# Patient Record
Sex: FEMALE | Race: BLACK OR AFRICAN AMERICAN | NOT HISPANIC OR LATINO | Employment: FULL TIME | URBAN - METROPOLITAN AREA
[De-identification: names, ages, dates, MRNs, and addresses within clinical notes are randomized per-mention and may not be internally consistent; named-entity substitution may affect disease eponyms.]

---

## 2018-11-07 ENCOUNTER — TELEPHONE (OUTPATIENT)
Dept: FAMILY MEDICINE | Facility: CLINIC | Age: 38
End: 2018-11-07

## 2018-11-07 NOTE — TELEPHONE ENCOUNTER
----- Message from Rob Galvez sent at 11/7/2018  9:37 AM CST -----  Patient is requesting orders be put in chart:    Orders Requested: Blood work  Reason for the orders: Physical on 11.13  Call Back: [phone] (home)     Please call once orders have been entered.

## 2018-11-13 ENCOUNTER — OFFICE VISIT (OUTPATIENT)
Dept: FAMILY MEDICINE | Facility: CLINIC | Age: 38
End: 2018-11-13
Payer: COMMERCIAL

## 2018-11-13 ENCOUNTER — LAB VISIT (OUTPATIENT)
Dept: LAB | Facility: HOSPITAL | Age: 38
End: 2018-11-13
Attending: FAMILY MEDICINE
Payer: COMMERCIAL

## 2018-11-13 VITALS
HEIGHT: 65 IN | HEART RATE: 72 BPM | WEIGHT: 241.38 LBS | SYSTOLIC BLOOD PRESSURE: 122 MMHG | DIASTOLIC BLOOD PRESSURE: 84 MMHG | BODY MASS INDEX: 40.22 KG/M2

## 2018-11-13 DIAGNOSIS — J30.1 NON-SEASONAL ALLERGIC RHINITIS DUE TO POLLEN: ICD-10-CM

## 2018-11-13 DIAGNOSIS — R63.5 ABNORMAL WEIGHT GAIN: ICD-10-CM

## 2018-11-13 DIAGNOSIS — Z00.01 ENCOUNTER FOR ROUTINE ADULT HEALTH EXAMINATION WITH ABNORMAL FINDINGS: ICD-10-CM

## 2018-11-13 DIAGNOSIS — E66.01 CLASS 3 SEVERE OBESITY DUE TO EXCESS CALORIES WITHOUT SERIOUS COMORBIDITY WITH BODY MASS INDEX (BMI) OF 40.0 TO 44.9 IN ADULT: ICD-10-CM

## 2018-11-13 DIAGNOSIS — Z00.01 ENCOUNTER FOR ROUTINE ADULT HEALTH EXAMINATION WITH ABNORMAL FINDINGS: Primary | ICD-10-CM

## 2018-11-13 PROBLEM — E66.813 CLASS 3 SEVERE OBESITY DUE TO EXCESS CALORIES WITHOUT SERIOUS COMORBIDITY WITH BODY MASS INDEX (BMI) OF 40.0 TO 44.9 IN ADULT: Status: ACTIVE | Noted: 2018-11-13

## 2018-11-13 LAB
ALBUMIN SERPL BCP-MCNC: 3.5 G/DL
ALP SERPL-CCNC: 66 U/L
ALT SERPL W/O P-5'-P-CCNC: 17 U/L
ANION GAP SERPL CALC-SCNC: 7 MMOL/L
AST SERPL-CCNC: 21 U/L
BASOPHILS # BLD AUTO: 0.04 K/UL
BASOPHILS NFR BLD: 0.5 %
BILIRUB SERPL-MCNC: 0.3 MG/DL
BUN SERPL-MCNC: 8 MG/DL
CALCIUM SERPL-MCNC: 9.5 MG/DL
CHLORIDE SERPL-SCNC: 104 MMOL/L
CHOLEST SERPL-MCNC: 219 MG/DL
CHOLEST/HDLC SERPL: 4.1 {RATIO}
CO2 SERPL-SCNC: 27 MMOL/L
CREAT SERPL-MCNC: 1 MG/DL
DIFFERENTIAL METHOD: ABNORMAL
EOSINOPHIL # BLD AUTO: 0.1 K/UL
EOSINOPHIL NFR BLD: 1.5 %
ERYTHROCYTE [DISTWIDTH] IN BLOOD BY AUTOMATED COUNT: 13 %
EST. GFR  (AFRICAN AMERICAN): >60 ML/MIN/1.73 M^2
EST. GFR  (NON AFRICAN AMERICAN): >60 ML/MIN/1.73 M^2
GLUCOSE SERPL-MCNC: 108 MG/DL
HCT VFR BLD AUTO: 43 %
HDLC SERPL-MCNC: 54 MG/DL
HDLC SERPL: 24.7 %
HGB BLD-MCNC: 13.6 G/DL
IMM GRANULOCYTES # BLD AUTO: 0.02 K/UL
IMM GRANULOCYTES NFR BLD AUTO: 0.3 %
LDLC SERPL CALC-MCNC: 127.8 MG/DL
LYMPHOCYTES # BLD AUTO: 1.7 K/UL
LYMPHOCYTES NFR BLD: 21.1 %
MCH RBC QN AUTO: 31.1 PG
MCHC RBC AUTO-ENTMCNC: 31.6 G/DL
MCV RBC AUTO: 98 FL
MONOCYTES # BLD AUTO: 0.6 K/UL
MONOCYTES NFR BLD: 7.4 %
NEUTROPHILS # BLD AUTO: 5.5 K/UL
NEUTROPHILS NFR BLD: 69.2 %
NONHDLC SERPL-MCNC: 165 MG/DL
NRBC BLD-RTO: 0 /100 WBC
PLATELET # BLD AUTO: 246 K/UL
PMV BLD AUTO: 11.1 FL
POTASSIUM SERPL-SCNC: 4.5 MMOL/L
PROT SERPL-MCNC: 7.4 G/DL
RBC # BLD AUTO: 4.37 M/UL
SODIUM SERPL-SCNC: 138 MMOL/L
TRIGL SERPL-MCNC: 186 MG/DL
TSH SERPL DL<=0.005 MIU/L-ACNC: 1.48 UIU/ML
WBC # BLD AUTO: 7.93 K/UL

## 2018-11-13 PROCEDURE — 80053 COMPREHEN METABOLIC PANEL: CPT

## 2018-11-13 PROCEDURE — 85025 COMPLETE CBC W/AUTO DIFF WBC: CPT

## 2018-11-13 PROCEDURE — 90714 TD VACC NO PRESV 7 YRS+ IM: CPT | Mod: S$GLB,,, | Performed by: FAMILY MEDICINE

## 2018-11-13 PROCEDURE — 99999 PR PBB SHADOW E&M-EST. PATIENT-LVL III: CPT | Mod: PBBFAC,,, | Performed by: FAMILY MEDICINE

## 2018-11-13 PROCEDURE — 80061 LIPID PANEL: CPT

## 2018-11-13 PROCEDURE — 99385 PREV VISIT NEW AGE 18-39: CPT | Mod: S$GLB,,, | Performed by: FAMILY MEDICINE

## 2018-11-13 PROCEDURE — 84443 ASSAY THYROID STIM HORMONE: CPT

## 2018-11-13 PROCEDURE — 90471 IMMUNIZATION ADMIN: CPT | Mod: S$GLB,,, | Performed by: FAMILY MEDICINE

## 2018-11-13 PROCEDURE — 36415 COLL VENOUS BLD VENIPUNCTURE: CPT | Mod: PO

## 2018-11-13 RX ORDER — AZELASTINE HYDROCHLORIDE 0.5 MG/ML
1 SOLUTION/ DROPS OPHTHALMIC 2 TIMES DAILY
COMMUNITY

## 2018-11-13 RX ORDER — NORGESTIMATE AND ETHINYL ESTRADIOL 7DAYSX3 LO
1 KIT ORAL DAILY
COMMUNITY
End: 2019-07-01 | Stop reason: SDUPTHER

## 2018-11-13 RX ORDER — MOMETASONE FUROATE 50 UG/1
SPRAY, METERED NASAL DAILY
COMMUNITY
End: 2022-11-22

## 2018-11-13 RX ORDER — MONTELUKAST SODIUM 10 MG/1
10 TABLET ORAL NIGHTLY
COMMUNITY
End: 2021-03-05 | Stop reason: SDUPTHER

## 2018-11-13 RX ORDER — AZELASTINE 1 MG/ML
1 SPRAY, METERED NASAL 2 TIMES DAILY
COMMUNITY

## 2018-11-13 NOTE — PATIENT INSTRUCTIONS

## 2018-11-13 NOTE — PROGRESS NOTES
Subjective:       Patient ID: Fabienne Cummings is a 38 y.o. female.    Chief Complaint: Annual Exam and Nasal Congestion    The patient is coming here today for annual checkup.  The patient came from [place], she has history of chronic allergies, which she takes montelukast, and uses Astelin and Nasonex with moderate relief of the symptoms, she currently sees an allergist.  She states that in the last 6 months she has been gaining more than 20 lb.  When she came from [place], her eating habits change and started to gain weight.  She just started to exercise again at least 1 hr twice a week and also eating healthier.  She denies any symptoms of chest pain, shortness of breath, nausea, vomiting, abdominal pain, diarrhea and constipation.     Past medical history, past social history, past Family history, past surgical history was reviewed and discussed with the patient    Review of Systems   Constitutional: Positive for unexpected weight change. Negative for activity change, appetite change and chills.   HENT: Negative for congestion, ear discharge and postnasal drip.    Eyes: Negative for discharge and itching.   Respiratory: Negative for choking and chest tightness.    Cardiovascular: Negative for chest pain, palpitations and leg swelling.   Gastrointestinal: Negative for abdominal distention, abdominal pain and constipation.   Endocrine: Negative for cold intolerance and heat intolerance.   Genitourinary: Negative for dysuria and flank pain.   Musculoskeletal: Negative for arthralgias and back pain.   Skin: Negative for pallor and rash.   Allergic/Immunologic: Positive for environmental allergies. Negative for food allergies.   Neurological: Negative for dizziness, facial asymmetry and headaches.   Hematological: Negative for adenopathy. Does not bruise/bleed easily.   Psychiatric/Behavioral: Negative for agitation, confusion, decreased concentration and sleep disturbance.       Objective:      Physical Exam    Constitutional: She appears well-developed and well-nourished. No distress.   HENT:   Head: Normocephalic and atraumatic.   Right Ear: External ear normal.   Left Ear: External ear normal.   Nose: Nose normal.   Mouth/Throat: Oropharynx is clear and moist. No oropharyngeal exudate.   Eyes: Conjunctivae are normal. Pupils are equal, round, and reactive to light. Right eye exhibits no discharge. Left eye exhibits no discharge.   Neck: Neck supple. No tracheal deviation present. No thyromegaly present.   Cardiovascular: Normal rate, regular rhythm, normal heart sounds and intact distal pulses.   No murmur heard.  Pulmonary/Chest: Effort normal and breath sounds normal. No respiratory distress. She has no wheezes.   Abdominal: She exhibits no distension. There is no tenderness.   Musculoskeletal: She exhibits no tenderness or deformity.   Lymphadenopathy:     She has no cervical adenopathy.   Neurological: No cranial nerve deficit. Coordination normal.   Skin: No rash noted. She is not diaphoretic. No erythema. No pallor.   Psychiatric: She has a normal mood and affect. Her behavior is normal. Judgment and thought content normal.   Nursing note and vitals reviewed.      Assessment:       1. Encounter for routine adult health examination with abnormal findings    2. Abnormal weight gain    3. BMI 40.0-44.9, adult    4. Class 3 severe obesity due to excess calories without serious comorbidity with body mass index (BMI) of 40.0 to 44.9 in adult    5. Non-seasonal allergic rhinitis due to pollen        Plan:       Encounter for routine adult health examination with abnormal findings  -     CBC auto differential; Future; Expected date: 11/13/2018  -     Lipid panel; Future; Expected date: 11/13/2018  -     Comprehensive metabolic panel; Future; Expected date: 11/13/2018  -     TSH; Future; Expected date: 11/13/2018    Abnormal weight gain:  Worsening  -     CBC auto differential; Future; Expected date: 11/13/2018  -      Lipid panel; Future; Expected date: 11/13/2018  -     Comprehensive metabolic panel; Future; Expected date: 11/13/2018  -     TSH; Future; Expected date: 11/13/2018    BMI 40.0-44.9, adult:  Worsening    Class 3 severe obesity due to excess calories without serious comorbidity with body mass index (BMI) of 40.0 to 44.9 in adult:  Worsening    Non-seasonal allergic rhinitis due to pollen:  Stable.  Continue with current treatment    Other orders  -     (In Office Administered) Td Vaccine - Preservative Free    Will call the patient after we have the results of the test, the patient states that she had a Pap smear done by her primary care physician in [place] 3 years ago and was told that she did not need another 1 for 5 years.  Medical records were ordered.  Healthy eating habits, weight fried foods, red meat and processed starches, follow up with the allergist as directed. The patient's BMI has been recorded in the chart. The patient has been provided educational materials regarding the benefits of attaining and maintaining a normal weight. We will continue to address and follow this issue during follow up visits.Patient agreed with assessment and plan. Patient verbalized understanding.

## 2018-11-13 NOTE — Clinical Note
Patient had a pap smear done by Dr. MALGORZATA Diaz [id number], 2015. [facility].

## 2018-11-14 ENCOUNTER — TELEPHONE (OUTPATIENT)
Dept: FAMILY MEDICINE | Facility: CLINIC | Age: 38
End: 2018-11-14

## 2018-11-14 NOTE — TELEPHONE ENCOUNTER
----- Message from Duane Herrera sent at 11/14/2018 12:48 PM CST -----  Contact: Patient  Type: Needs Medical Advice    Who Called:  Patient  Best Call Back Number: [phone]  Additional Information: Patient previously had information faxed over and would like to know if it was received. Please call to advise. Thanks!

## 2018-11-15 ENCOUNTER — TELEPHONE (OUTPATIENT)
Dept: ADMINISTRATIVE | Facility: HOSPITAL | Age: 38
End: 2018-11-15

## 2018-11-15 NOTE — TELEPHONE ENCOUNTER
----- Message from Ladi Noyola MD sent at 11/13/2018  9:56 AM CST -----  Patient had a pap smear done by Dr. MALGORZATA Diaz [id number], 2015. [facility].

## 2019-04-12 DIAGNOSIS — Z12.39 BREAST CANCER SCREENING: ICD-10-CM

## 2019-05-01 ENCOUNTER — PATIENT OUTREACH (OUTPATIENT)
Dept: ADMINISTRATIVE | Facility: HOSPITAL | Age: 39
End: 2019-05-01

## 2019-05-13 ENCOUNTER — HOSPITAL ENCOUNTER (OUTPATIENT)
Dept: RADIOLOGY | Facility: HOSPITAL | Age: 39
Discharge: HOME | End: 2019-05-13
Attending: FAMILY MEDICINE
Payer: COMMERCIAL

## 2019-05-13 VITALS — BODY MASS INDEX: 40.15 KG/M2 | WEIGHT: 241 LBS | HEIGHT: 65 IN

## 2019-05-13 DIAGNOSIS — Z12.39 BREAST CANCER SCREENING: ICD-10-CM

## 2019-05-13 PROCEDURE — 77067 MAMMO DIGITAL SCREENING BILAT WITH TOMOSYNTHESIS_CAD: ICD-10-PCS | Mod: 26,,, | Performed by: RADIOLOGY

## 2019-05-13 PROCEDURE — 77063 BREAST TOMOSYNTHESIS BI: CPT | Mod: 26,,, | Performed by: RADIOLOGY

## 2019-05-13 PROCEDURE — 77067 SCR MAMMO BI INCL CAD: CPT | Mod: TC,PN

## 2019-05-13 PROCEDURE — 77067 SCR MAMMO BI INCL CAD: CPT | Mod: 26,,, | Performed by: RADIOLOGY

## 2019-05-13 PROCEDURE — 77063 MAMMO DIGITAL SCREENING BILAT WITH TOMOSYNTHESIS_CAD: ICD-10-PCS | Mod: 26,,, | Performed by: RADIOLOGY

## 2019-05-15 ENCOUNTER — PATIENT MESSAGE (OUTPATIENT)
Dept: FAMILY MEDICINE | Facility: CLINIC | Age: 39
End: 2019-05-15

## 2019-05-15 ENCOUNTER — OFFICE VISIT (OUTPATIENT)
Dept: FAMILY MEDICINE | Facility: CLINIC | Age: 39
End: 2019-05-15
Payer: COMMERCIAL

## 2019-05-15 VITALS
DIASTOLIC BLOOD PRESSURE: 80 MMHG | SYSTOLIC BLOOD PRESSURE: 130 MMHG | WEIGHT: 237.44 LBS | BODY MASS INDEX: 39.51 KG/M2 | OXYGEN SATURATION: 99 % | HEART RATE: 71 BPM

## 2019-05-15 DIAGNOSIS — J30.1 NON-SEASONAL ALLERGIC RHINITIS DUE TO POLLEN: Primary | ICD-10-CM

## 2019-05-15 DIAGNOSIS — E66.09 CLASS 2 OBESITY DUE TO EXCESS CALORIES WITHOUT SERIOUS COMORBIDITY WITH BODY MASS INDEX (BMI) OF 39.0 TO 39.9 IN ADULT: ICD-10-CM

## 2019-05-15 DIAGNOSIS — E78.1 PURE HYPERGLYCERIDEMIA: ICD-10-CM

## 2019-05-15 PROCEDURE — 3008F PR BODY MASS INDEX (BMI) DOCUMENTED: ICD-10-PCS | Mod: CPTII,S$GLB,, | Performed by: FAMILY MEDICINE

## 2019-05-15 PROCEDURE — 99999 PR PBB SHADOW E&M-EST. PATIENT-LVL III: ICD-10-PCS | Mod: PBBFAC,,, | Performed by: FAMILY MEDICINE

## 2019-05-15 PROCEDURE — 99999 PR PBB SHADOW E&M-EST. PATIENT-LVL III: CPT | Mod: PBBFAC,,, | Performed by: FAMILY MEDICINE

## 2019-05-15 PROCEDURE — 99213 PR OFFICE/OUTPT VISIT, EST, LEVL III, 20-29 MIN: ICD-10-PCS | Mod: S$GLB,,, | Performed by: FAMILY MEDICINE

## 2019-05-15 PROCEDURE — 3008F BODY MASS INDEX DOCD: CPT | Mod: CPTII,S$GLB,, | Performed by: FAMILY MEDICINE

## 2019-05-15 PROCEDURE — 99213 OFFICE O/P EST LOW 20 MIN: CPT | Mod: S$GLB,,, | Performed by: FAMILY MEDICINE

## 2019-05-15 NOTE — PATIENT INSTRUCTIONS
Eating Heart-Healthy Foods  Eating has a big impact on your heart health. In fact, eating healthier can improve several of your heart risks at once. For instance, it helps you manage weight, cholesterol, and blood pressure. Here are ideas to help you make heart-healthy changes without giving up all the foods and flavors you love.  Getting started  · Talk with your health care provider about eating plans, such as the DASH or Mediterranean diet. You may also be referred to a dietitian.  · Change a few things at a time. Give yourself time to get used to a few eating changes before adding more.  · Work to create a tasty, healthy eating plan that you can stick to for the rest of your life.    Goals for healthy eating  Below are some tips to improve your eating habits:  · Limit saturated fats and trans fats. Saturated fats raise your levels of cholesterol, so keep these fats to a minimum. They are found in foods such as fatty meats, whole milk, cheese, and palm and coconut oils. Avoid trans fats because they lower good cholesterol as well as raise bad cholesterol. Trans fats are most often found in processed foods.  · Reduce sodium (salt) intake. Eating too much salt may increase your blood pressure. Limit your sodium intake to 2,300 milligrams (mg) per day, or less if your health care provider recommends it. Dining out less often and eating fewer processed foods are two great ways to decrease the amount of salt you consume.  · Managing calories. A calorie is a unit of energy. Your body burns calories for fuel, but if you eat more calories than your body burns, the extras are stored as fat. Your health care provider can help you create a diet plan to manage your calories. This will likely include eating healthier foods as well as exercising regularly. To help you track your progress, keep a diary to record what you eat and how often you exercise.  Choose the right foods  Aim to make these foods staples of your diet. If  you have diabetes, you may have different recommendations than what is listed here:  · Fruits and vegetable provide plenty of nutrients without a lot of calories. At meals, fill half your plate with these foods. Split the other half of your plate between whole grains and lean protein.  · Whole grains are high in fiber and rich in vitamins and nutrients. Good choices include whole-wheat bread, pasta, and brown rice.  · Lean proteins give you nutrition with less fat. Good choices include fish, skinless chicken, and beans.  · Low-fat or nonfat dairy provides nutrients without a lot of fat. Try low-fat or nonfat milk, cheese, or yogurt.  · Healthy fats can be good for you in small amounts. These are unsaturated fats, such as olive oil, nuts, and fish. Try to have at least 2 servings per week of fatty fish such as salmon, sardines, mackerel, rainbow trout, and albacore tuna. These contain omega-3 fatty acids, which are good for your heart. Flaxseed is another source of a heart-healthy fat.  More on heart healthy eating    Read food labels  Healthy eating starts at the grocery store. Be sure to pay attention to food labels on packaged foods. Look for products that are high in fiber and protein, and low in saturated fat, cholesterol, and sodium. Avoid products that contain trans fat. And pay close attention to serving size. For instance, if you plan to eat two servings, double all the numbers on the label.  Prepare food right  A key part of healthy cooking is cutting down on added fat and salt. Look on the internet for lower-fat, lower-sodium recipes. Also, try these tips:  · Remove fat from meat and skin from poultry before cooking.  · Skim fat from the surface of soups and sauces.  · Broil, boil, bake, steam, grill, and microwave food without added fats.  · Choose ingredients that spice up your food without adding calories, fat, or sodium. Try these items: horseradish, hot sauce, lemon, mustard, nonfat salad dressings,  "and vinegar. For salt-free herbs and spices, try basil, cilantro, cinnamon, pepper, and rosemary.  Date Last Reviewed: 6/25/2015  © 8965-2232 [employer]. [address] [place] [address]. All rights reserved. This information is not intended as a substitute for professional medical care. Always follow your healthcare professional's instructions.        Understanding Body Mass Index (BMI)  Body mass index (BMI) is a method of screening for a weight category using the ratio of your height to your weight. The BMI is a measure of overweight that is corrected for height. Knowing your BMI is a way to tell if you are at a healthy weight. The higher your BMI, the greater your risk for weight-related health problems.  What BMI means  · BMI below 18.5: Underweight  · BMI 18.5 to 24.9: Healthy weight or "ideal body weight"   · BMI 25 to 29.9: Overweight  · BMI 30 and over: Obese  · BMI 40 and over: Severe obesity   Online BMI Calculators  Find your BMI with an online BMI calculator tool, such as these from the CDC:  · BMI calculator for adults  · BMI calculator for children and teens   Using the BMI chart  To figure out your BMI, find your height and weight (or the numbers closest to them) on the table below. Follow each column of numbers to where your height and weight meet on the table. That is your BMI.    Date Last Reviewed: 7/1/2016  © 8380-0941 [employer]. [address] [place] [address]. All rights reserved. This information is not intended as a substitute for professional medical care. Always follow your healthcare professional's instructions.        "

## 2019-05-15 NOTE — PROGRESS NOTES
Subjective:       Patient ID: Fabienne Cummings is a 39 y.o. female.    Chief Complaint: Follow-up results, weight loss    The patient is coming here today for a follow-up visit, lost 4 lb since her last office visit, she just been able to exercise more since her Achillis tendinitis is improved after physical therapy.  The patient also is still having allergies which he takes montelukast, azelastine ophthalmic solution and Nasonex, she currently seen the ENT specialist for this problem.    Hyperlipidemia   This is a new problem. This is a new diagnosis. The problem is uncontrolled. Recent lipid tests were reviewed and are high. Exacerbating diseases include obesity. She has no history of chronic renal disease, diabetes, hypothyroidism, liver disease or nephrotic syndrome. Factors aggravating her hyperlipidemia include fatty foods. Pertinent negatives include no chest pain, focal sensory loss, focal weakness, leg pain, myalgias or shortness of breath. Current antihyperlipidemic treatment includes diet change. Improvement on treatment: Will order blood work at her next office visit. Compliance problems include adherence to diet and adherence to exercise.  Risk factors for coronary artery disease include dyslipidemia, obesity and a sedentary lifestyle.      Past medical history, past social history was reviewed discussed with the patient.    Review of Systems   Constitutional: Positive for activity change. Negative for unexpected weight change.   Eyes: Negative for discharge and visual disturbance.   Respiratory: Negative for chest tightness, shortness of breath and wheezing.    Cardiovascular: Negative for chest pain and palpitations.   Endocrine: Negative for polyuria.   Genitourinary: Positive for menstrual problem. Negative for difficulty urinating, dysuria and hematuria.   Musculoskeletal: Negative for myalgias.   Neurological: Negative for focal weakness.       Objective:      Physical Exam   Constitutional: She  appears well-developed and well-nourished. No distress.   HENT:   Head: Normocephalic and atraumatic.   Right Ear: External ear normal.   Left Ear: External ear normal.   Eyes: Right eye exhibits no discharge. Left eye exhibits no discharge.   Cardiovascular: Normal rate, regular rhythm and normal heart sounds.   No murmur heard.  Pulmonary/Chest: Effort normal and breath sounds normal. No respiratory distress. She has no wheezes.   Musculoskeletal: She exhibits no deformity.   Neurological: No cranial nerve deficit.   Skin: She is not diaphoretic. No erythema.   Psychiatric: She has a normal mood and affect. Her behavior is normal. Judgment and thought content normal.   Nursing note and vitals reviewed.      Assessment:       1. Non-seasonal allergic rhinitis due to pollen    2. Pure hyperglyceridemia    3. BMI 39.0-39.9,adult        Plan:       Non-seasonal allergic rhinitis due to pollen: Stable    Pure hyperglyceridemia: Uncontrolled.    Class 2 obesity due to excess calories without serious comorbidity with body mass index (BMI) of 39.0 to 39.9 in adult:  Improved    The patient was advised to continue to eat healthy, avoid fried foods, red meat and processed starches, try to eat 3 meals a day, 3 snacks and small portions, decrease carbohydrate intake, drink plenty water, increase physical activity as tolerated.  Continue taking the allergy medication, if the symptoms get worse, the patient will notify us immediately.  Will to Pap smear at her next office visit as patient preference.The patient's BMI has been recorded in the chart. The patient has been provided educational materials regarding the benefits of attaining and maintaining a normal weight. We will continue to address and follow this issue during follow up visits.Patient agreed with assessment and plan. Patient verbalized understanding.

## 2019-05-16 ENCOUNTER — PATIENT MESSAGE (OUTPATIENT)
Dept: FAMILY MEDICINE | Facility: CLINIC | Age: 39
End: 2019-05-16

## 2019-05-17 DIAGNOSIS — Z12.39 BREAST CANCER SCREENING: ICD-10-CM

## 2019-05-24 ENCOUNTER — PATIENT MESSAGE (OUTPATIENT)
Dept: ADMINISTRATIVE | Facility: HOSPITAL | Age: 39
End: 2019-05-24

## 2019-07-01 ENCOUNTER — PATIENT MESSAGE (OUTPATIENT)
Dept: FAMILY MEDICINE | Facility: CLINIC | Age: 39
End: 2019-07-01

## 2019-07-01 RX ORDER — NORGESTIMATE AND ETHINYL ESTRADIOL 7DAYSX3 LO
1 KIT ORAL DAILY
Qty: 90 TABLET | Refills: 0 | Status: SHIPPED | OUTPATIENT
Start: 2019-07-01 | End: 2019-07-08 | Stop reason: SDUPTHER

## 2019-07-08 RX ORDER — NORGESTIMATE AND ETHINYL ESTRADIOL 7DAYSX3 LO
1 KIT ORAL DAILY
Qty: 90 TABLET | Refills: 0 | Status: SHIPPED | OUTPATIENT
Start: 2019-07-08 | End: 2019-09-12 | Stop reason: SDUPTHER

## 2019-07-08 NOTE — TELEPHONE ENCOUNTER
----- Message from Duane Herrera sent at 7/8/2019  2:02 PM CDT -----  Contact: Patient  Type:  RX Refill Request    Who Called:  Patient  Refill or New Rx:  Refill  RX Name and Strength:  norgestimate-ethinyl estradiol (ORTHO TRI-CYCLEN LO) 0.18/0.215/0.25 mg-25 mcg tablet  How is the patient currently taking it? (ex. 1XDay):  As ordered  Is this a 30 day or 90 day RX:  90  Preferred Pharmacy with phone number:    [id number] - [place], [place] - [address] [facility] [address]  [address] [address]  Phone: [phone] Fax: [phone]  Local or Mail Order:  Local  Ordering Provider:  Dr. Jazmín Prieto Call Back Number:  [phone]  Additional Information:  Patient states that [employer] (and [facility] by extension) is not able to refill the above medication. Patient would prefer for the first refill to be sent to [facility] (and is fine with currently only receiving 30 day supply) and for future orders to be sent to the below. Thanks!    [facility] - [place], [place] - [address]  [address]  [address]  [facility] [address]  Phone: [phone] Fax: [phone]

## 2019-07-08 NOTE — TELEPHONE ENCOUNTER
Patient states her employer changed prescription suppliers; today she can  rx from [facility], going forward she will use [facility].

## 2019-09-12 RX ORDER — NORGESTIMATE AND ETHINYL ESTRADIOL 7DAYSX3 LO
1 KIT ORAL DAILY
Qty: 90 TABLET | Refills: 0 | Status: SHIPPED | OUTPATIENT
Start: 2019-09-12 | End: 2019-09-17 | Stop reason: SDUPTHER

## 2019-09-17 RX ORDER — NORGESTIMATE AND ETHINYL ESTRADIOL 7DAYSX3 LO
1 KIT ORAL DAILY
Qty: 90 TABLET | Refills: 0 | Status: SHIPPED | OUTPATIENT
Start: 2019-09-17 | End: 2019-11-15 | Stop reason: SDUPTHER

## 2019-10-08 ENCOUNTER — PATIENT MESSAGE (OUTPATIENT)
Dept: FAMILY MEDICINE | Facility: CLINIC | Age: 39
End: 2019-10-08

## 2019-11-12 ENCOUNTER — PATIENT OUTREACH (OUTPATIENT)
Dept: ADMINISTRATIVE | Facility: HOSPITAL | Age: 39
End: 2019-11-12

## 2019-11-15 ENCOUNTER — LAB VISIT (OUTPATIENT)
Dept: LAB | Facility: HOSPITAL | Age: 39
End: 2019-11-15
Attending: FAMILY MEDICINE
Payer: COMMERCIAL

## 2019-11-15 ENCOUNTER — OFFICE VISIT (OUTPATIENT)
Dept: FAMILY MEDICINE | Facility: CLINIC | Age: 39
End: 2019-11-15
Payer: COMMERCIAL

## 2019-11-15 VITALS
HEART RATE: 68 BPM | SYSTOLIC BLOOD PRESSURE: 134 MMHG | OXYGEN SATURATION: 98 % | BODY MASS INDEX: 39.6 KG/M2 | DIASTOLIC BLOOD PRESSURE: 78 MMHG | WEIGHT: 238 LBS

## 2019-11-15 DIAGNOSIS — R73.09 ABNORMAL GLUCOSE: ICD-10-CM

## 2019-11-15 DIAGNOSIS — Z00.01 ENCOUNTER FOR ROUTINE ADULT HEALTH EXAMINATION WITH ABNORMAL FINDINGS: Primary | ICD-10-CM

## 2019-11-15 DIAGNOSIS — E66.09 CLASS 2 OBESITY DUE TO EXCESS CALORIES WITHOUT SERIOUS COMORBIDITY WITH BODY MASS INDEX (BMI) OF 39.0 TO 39.9 IN ADULT: ICD-10-CM

## 2019-11-15 DIAGNOSIS — Z30.41 ENCOUNTER FOR SURVEILLANCE OF CONTRACEPTIVE PILLS: ICD-10-CM

## 2019-11-15 DIAGNOSIS — Z00.01 ENCOUNTER FOR ROUTINE ADULT HEALTH EXAMINATION WITH ABNORMAL FINDINGS: ICD-10-CM

## 2019-11-15 DIAGNOSIS — Z12.39 SCREENING FOR BREAST CANCER: ICD-10-CM

## 2019-11-15 PROBLEM — E66.812 CLASS 2 OBESITY DUE TO EXCESS CALORIES WITHOUT SERIOUS COMORBIDITY WITH BODY MASS INDEX (BMI) OF 39.0 TO 39.9 IN ADULT: Status: ACTIVE | Noted: 2018-11-13

## 2019-11-15 PROCEDURE — 88141 CYTOPATH C/V INTERPRET: CPT | Mod: ,,, | Performed by: PATHOLOGY

## 2019-11-15 PROCEDURE — 90471 IMMUNIZATION ADMIN: CPT | Mod: S$GLB,,, | Performed by: FAMILY MEDICINE

## 2019-11-15 PROCEDURE — 88142 CYTOPATH C/V THIN LAYER: CPT | Performed by: PATHOLOGY

## 2019-11-15 PROCEDURE — 80061 LIPID PANEL: CPT

## 2019-11-15 PROCEDURE — 36415 COLL VENOUS BLD VENIPUNCTURE: CPT | Mod: PO

## 2019-11-15 PROCEDURE — 99396 PREV VISIT EST AGE 40-64: CPT | Mod: 25,S$GLB,, | Performed by: FAMILY MEDICINE

## 2019-11-15 PROCEDURE — 90471 FLU VACCINE (QUAD) GREATER THAN OR EQUAL TO 3YO PRESERVATIVE FREE IM: ICD-10-PCS | Mod: S$GLB,,, | Performed by: FAMILY MEDICINE

## 2019-11-15 PROCEDURE — 83036 HEMOGLOBIN GLYCOSYLATED A1C: CPT

## 2019-11-15 PROCEDURE — 99999 PR PBB SHADOW E&M-EST. PATIENT-LVL III: ICD-10-PCS | Mod: PBBFAC,,, | Performed by: FAMILY MEDICINE

## 2019-11-15 PROCEDURE — 99999 PR PBB SHADOW E&M-EST. PATIENT-LVL III: CPT | Mod: PBBFAC,,, | Performed by: FAMILY MEDICINE

## 2019-11-15 PROCEDURE — 90686 FLU VACCINE (QUAD) GREATER THAN OR EQUAL TO 3YO PRESERVATIVE FREE IM: ICD-10-PCS | Mod: S$GLB,,, | Performed by: FAMILY MEDICINE

## 2019-11-15 PROCEDURE — 80053 COMPREHEN METABOLIC PANEL: CPT

## 2019-11-15 PROCEDURE — 99396 PR PREVENTIVE VISIT,EST,40-64: ICD-10-PCS | Mod: 25,S$GLB,, | Performed by: FAMILY MEDICINE

## 2019-11-15 PROCEDURE — 88141 PR  CYTOPATH CERV/VAG INTERPRET: ICD-10-PCS | Mod: ,,, | Performed by: PATHOLOGY

## 2019-11-15 PROCEDURE — 85025 COMPLETE CBC W/AUTO DIFF WBC: CPT

## 2019-11-15 PROCEDURE — 87624 HPV HI-RISK TYP POOLED RSLT: CPT

## 2019-11-15 PROCEDURE — 90686 IIV4 VACC NO PRSV 0.5 ML IM: CPT | Mod: S$GLB,,, | Performed by: FAMILY MEDICINE

## 2019-11-15 RX ORDER — NORGESTIMATE AND ETHINYL ESTRADIOL 7DAYSX3 LO
1 KIT ORAL DAILY
Qty: 90 TABLET | Refills: 3 | Status: SHIPPED | OUTPATIENT
Start: 2019-11-15 | End: 2019-12-03 | Stop reason: ALTCHOICE

## 2019-11-15 NOTE — PATIENT INSTRUCTIONS
Eating Heart-Healthy Food: Using the DASH Plan    Eating for your heart doesnt have to be hard or boring. You just need to know how to make healthier choices. The DASH eating plan has been developed to help you do just that. DASH stands for Dietary Approaches to Stop Hypertension. It is a plan that has been proven to be healthier for your heart and to lower your risk for high blood pressure. It can also help lower your risk for cancer, heart disease, osteoporosis, and diabetes.  Choosing from each food group  Choose foods from each of the food groups below each day. Try to get the recommended number of servings for each food group. The serving numbers are based on a diet of 2,000 calories a day. Talk to your doctor if youre unsure about your calorie needs. Along with getting the correct servings, the DASH plan also recommends a sodium intake less than 2,300 mg per day.        Grains  Servings: 6 to 8 a day  A serving is:  · 1 slice bread  · 1 ounce dry cereal  · Half a cup cooked rice, pasta or cereal  Best choices: Whole grains and any grains high in fiber. Vegetables  Servings: 4 to 5 a day  A serving is:  · 1 cup raw leafy vegetable  · Half a cup cut-up raw or cooked vegetable  · Half a cup vegetable juice  Best choices: Fresh or frozen vegetables prepared without added salt or fat.   Fruits  Servings: 4 to 5 a day  A serving is:  · 1 medium fruit  · One-quarter cup dried fruit  · Half a cup fresh, frozen, or canned fruit  · Half a cup of 100% fruit juices  Best choices: A variety of fresh fruits of different colors. Whole fruits are a better choice than fruit juices. Low-fat or fat-free dairy  Servings: 2 to 3 a day  A serving is:  · 1 cup milk  · 1 cup yogurt  · One and a half ounces cheese  Best choices: Skim or 1% milk, low-fat or fat-free yogurt or buttermilk, and low-fat cheeses.         Lean meats, poultry, fish  Servings: 6 or fewer a day  A serving is:  · 1 ounce cooked meats, poultry, or fish  · 1  egg  Best choices: Lean poultry and fish. Trim away visible fat. Broil, grill, roast, or boil instead of frying. Remove skin from poultry before eating. Limit how much red meat you eat.  Nuts, seeds, beans  Servings: 4 to 5 a week  A serving is:  · One-third cup nuts (one and a half ounces)  · 2 tablespoons nut butter or seeds  · Half a cup cooked dry beans or legumes  Best choices: Dry roasted nuts with no salt added, lentils, kidney beans, garbanzo beans, and whole durán beans.   Fats and oils  Servings: 2 to 3 a day  A serving is:  · 1 teaspoon vegetable oil  · 1 teaspoon soft margarine  · 1 tablespoon mayonnaise  · 2 tablespoons salad dressing  Best choices: Nut and vegetable oils (nontropical vegetable oils), such as olive and canola oil. Sweets  Servings: 5 a week or fewer  A serving is:  · 1 tablespoon sugar, maple syrup, or honey  · 1 tablespoon jam or jelly  · 1 half-ounce jelly beans (about 15)  · 1 cup lemonade  Best choices: Dried fruit can be a satisfying sweet. Choose low-fat sweets. And watch your serving sizes!      For more on the DASH eating plan, visit:  www.nhlbi.nih.gov/health/health-topics/topics/dash   Date Last Reviewed: 6/1/2016  © 9574-7503 [employer]. [address] [place] [address], [place] [address]. All rights reserved. This information is not intended as a substitute for professional medical care. Always follow your healthcare professional's instructions.

## 2019-11-15 NOTE — PROGRESS NOTES
Subjective:       Patient ID: Fabienne Cummings is a 39 y.o. female.    Chief Complaint:  Annual checkup    HPI     The patient is coming here today for annual checkup.  She is due for Pap smear.  She complains of gaining weight, she is trying to eat healthier.  She has also complained of increased frequency of urination and sometimes developed during the menstrual cycle problems with mood headaches and weakness.  The patient still having rhinorrhea, she has been using her medications as directed, stated that the symptoms of congestion are improved.  The patient denies any history of abnormal Pap smear.  The patient denies symptoms of chest pain, shortness of breath, nausea, vomiting, abdominal pain, diarrhea or constipation.  The patient also needs her prescription for contraception.  The last blood work showed presence of increased glucose levels in the prediabetic range.    Past medical history, past social history was reviewed and discussed with the patient.    Review of Systems   Constitutional: Positive for activity change and unexpected weight change.   HENT: Positive for rhinorrhea and trouble swallowing. Negative for hearing loss.    Eyes: Negative for discharge and visual disturbance.   Respiratory: Negative for chest tightness and wheezing.    Cardiovascular: Negative for chest pain and palpitations.   Gastrointestinal: Negative for blood in stool, constipation, diarrhea and vomiting.   Endocrine: Positive for polyuria. Negative for polydipsia.   Genitourinary: Positive for menstrual problem. Negative for difficulty urinating, dysuria and hematuria.   Musculoskeletal: Negative for arthralgias, joint swelling and neck pain.   Neurological: Positive for weakness and headaches.   Psychiatric/Behavioral: Positive for dysphoric mood. Negative for confusion.       Objective:      Physical Exam   Constitutional: She appears well-developed and well-nourished. No distress.   HENT:   Head: Normocephalic and  atraumatic.   Right Ear: External ear normal.   Left Ear: External ear normal.   Mouth/Throat: Oropharynx is clear and moist. No oropharyngeal exudate.   Eyes: Pupils are equal, round, and reactive to light. Conjunctivae are normal. Right eye exhibits no discharge. Left eye exhibits no discharge.   Neck: Neck supple. No tracheal deviation present. No thyromegaly present.   Cardiovascular: Normal rate, regular rhythm and normal heart sounds.   No murmur heard.  Pulmonary/Chest: Effort normal and breath sounds normal. No respiratory distress. She has no wheezes.   Abdominal: She exhibits no distension. There is no tenderness.   Genitourinary: There is no rash, tenderness or lesion on the right labia. There is no rash, tenderness or lesion on the left labia. Uterus is not deviated and not enlarged. Cervix exhibits no motion tenderness and no discharge. Right adnexum displays no mass. Left adnexum displays no mass. No erythema in the vagina. No signs of injury around the vagina.   Musculoskeletal: She exhibits no tenderness or deformity.   Neurological: No cranial nerve deficit. Coordination normal.   Skin: No rash noted. She is not diaphoretic. No erythema. No pallor.   Psychiatric: She has a normal mood and affect. Her behavior is normal. Judgment and thought content normal.   Nursing note and vitals reviewed.      Assessment:       1. Encounter for routine adult health examination with abnormal findings    2. Abnormal glucose    3. Class 2 obesity due to excess calories without serious comorbidity with body mass index (BMI) of 39.0 to 39.9 in adult    4. Screening for breast cancer        Plan:       Encounter for routine adult health examination with abnormal findings  -     Liquid-Based Pap Smear, Screening  -     HPV High Risk Genotypes, PCR  -     CBC auto differential; Future; Expected date: 11/15/2019  -     Comprehensive metabolic panel; Future; Expected date: 11/15/2019  -     Lipid panel; Future; Expected  date: 11/15/2019  -     Hemoglobin A1c; Future; Expected date: 11/15/2019  -     Influenza - Quadrivalent (PF)    Abnormal glucose:  New problem, workup needed  -     Hemoglobin A1c; Future; Expected date: 11/15/2019    Class 2 obesity due to excess calories without serious comorbidity with body mass index (BMI) of 39.0 to 39.9 in adult:  Worsening    Screening for breast cancer  -     Cancel: Mammo Digital Screening Bilateral With CAD; Future; Expected date: 11/15/2019    Encounter for surveillance of contraceptive pills  -     norgestimate-ethinyl estradiol (ORTHO TRI-CYCLEN LO) 0.18/0.215/0.25 mg-25 mcg tablet; Take 1 tablet by mouth once daily.  Dispense: 90 tablet; Refill: 3    Will call the patient after have the results of the test.  Healthy habits, weight fried foods, red meat and processed starches, drink plenty water.The patient's BMI has been recorded in the chart. The patient has been provided educational materials regarding the benefits of attaining and maintaining a normal weight. We will continue to address and follow this issue during follow up visits.Patient agreed with assessment and plan. Patient verbalized understanding.

## 2019-11-16 LAB
ALBUMIN SERPL BCP-MCNC: 3.6 G/DL (ref 3.5–5.2)
ALP SERPL-CCNC: 82 U/L (ref 55–135)
ALT SERPL W/O P-5'-P-CCNC: 17 U/L (ref 10–44)
ANION GAP SERPL CALC-SCNC: 9 MMOL/L (ref 8–16)
AST SERPL-CCNC: 28 U/L (ref 10–40)
BASOPHILS # BLD AUTO: 0.05 K/UL (ref 0–0.2)
BASOPHILS NFR BLD: 0.6 % (ref 0–1.9)
BILIRUB SERPL-MCNC: 0.4 MG/DL (ref 0.1–1)
BUN SERPL-MCNC: 12 MG/DL (ref 6–20)
CALCIUM SERPL-MCNC: 9.5 MG/DL (ref 8.7–10.5)
CHLORIDE SERPL-SCNC: 107 MMOL/L (ref 95–110)
CHOLEST SERPL-MCNC: 236 MG/DL (ref 120–199)
CHOLEST/HDLC SERPL: 3.8 {RATIO} (ref 2–5)
CO2 SERPL-SCNC: 23 MMOL/L (ref 23–29)
CREAT SERPL-MCNC: 1.1 MG/DL (ref 0.5–1.4)
DIFFERENTIAL METHOD: ABNORMAL
EOSINOPHIL # BLD AUTO: 0.1 K/UL (ref 0–0.5)
EOSINOPHIL NFR BLD: 1.6 % (ref 0–8)
ERYTHROCYTE [DISTWIDTH] IN BLOOD BY AUTOMATED COUNT: 13 % (ref 11.5–14.5)
EST. GFR  (AFRICAN AMERICAN): >60 ML/MIN/1.73 M^2
EST. GFR  (NON AFRICAN AMERICAN): >60 ML/MIN/1.73 M^2
ESTIMATED AVG GLUCOSE: 108 MG/DL (ref 68–131)
GLUCOSE SERPL-MCNC: 112 MG/DL (ref 70–110)
HBA1C MFR BLD HPLC: 5.4 % (ref 4–5.6)
HCT VFR BLD AUTO: 41.2 % (ref 37–48.5)
HDLC SERPL-MCNC: 62 MG/DL (ref 40–75)
HDLC SERPL: 26.3 % (ref 20–50)
HGB BLD-MCNC: 13.3 G/DL (ref 12–16)
IMM GRANULOCYTES # BLD AUTO: 0.03 K/UL (ref 0–0.04)
IMM GRANULOCYTES NFR BLD AUTO: 0.4 % (ref 0–0.5)
LDLC SERPL CALC-MCNC: 147.4 MG/DL (ref 63–159)
LYMPHOCYTES # BLD AUTO: 2.2 K/UL (ref 1–4.8)
LYMPHOCYTES NFR BLD: 25.5 % (ref 18–48)
MCH RBC QN AUTO: 31.2 PG (ref 27–31)
MCHC RBC AUTO-ENTMCNC: 32.3 G/DL (ref 32–36)
MCV RBC AUTO: 97 FL (ref 82–98)
MONOCYTES # BLD AUTO: 0.4 K/UL (ref 0.3–1)
MONOCYTES NFR BLD: 4.5 % (ref 4–15)
NEUTROPHILS # BLD AUTO: 5.7 K/UL (ref 1.8–7.7)
NEUTROPHILS NFR BLD: 67.4 % (ref 38–73)
NONHDLC SERPL-MCNC: 174 MG/DL
NRBC BLD-RTO: 0 /100 WBC
PLATELET # BLD AUTO: 250 K/UL (ref 150–350)
PMV BLD AUTO: 11.2 FL (ref 9.2–12.9)
POTASSIUM SERPL-SCNC: 4.7 MMOL/L (ref 3.5–5.1)
PROT SERPL-MCNC: 7.7 G/DL (ref 6–8.4)
RBC # BLD AUTO: 4.26 M/UL (ref 4–5.4)
SODIUM SERPL-SCNC: 139 MMOL/L (ref 136–145)
TRIGL SERPL-MCNC: 133 MG/DL (ref 30–150)
WBC # BLD AUTO: 8.5 K/UL (ref 3.9–12.7)

## 2019-11-21 LAB
HPV HR 12 DNA SPEC QL NAA+PROBE: NEGATIVE
HPV16 AG SPEC QL: NEGATIVE
HPV18 DNA SPEC QL NAA+PROBE: POSITIVE

## 2019-11-22 DIAGNOSIS — R87.810 CERVICAL HIGH RISK HPV (HUMAN PAPILLOMAVIRUS) TEST POSITIVE: Primary | ICD-10-CM

## 2019-11-27 LAB
FINAL PATHOLOGIC DIAGNOSIS: ABNORMAL
Lab: ABNORMAL

## 2019-12-03 ENCOUNTER — OFFICE VISIT (OUTPATIENT)
Dept: OBSTETRICS AND GYNECOLOGY | Facility: CLINIC | Age: 39
End: 2019-12-03
Payer: COMMERCIAL

## 2019-12-03 VITALS
DIASTOLIC BLOOD PRESSURE: 68 MMHG | WEIGHT: 243.38 LBS | BODY MASS INDEX: 40.55 KG/M2 | SYSTOLIC BLOOD PRESSURE: 114 MMHG | HEIGHT: 65 IN

## 2019-12-03 DIAGNOSIS — R87.612 LGSIL ON PAP SMEAR OF CERVIX: Primary | ICD-10-CM

## 2019-12-03 PROCEDURE — 3008F BODY MASS INDEX DOCD: CPT | Mod: CPTII,S$GLB,, | Performed by: SPECIALIST

## 2019-12-03 PROCEDURE — 3008F PR BODY MASS INDEX (BMI) DOCUMENTED: ICD-10-PCS | Mod: CPTII,S$GLB,, | Performed by: SPECIALIST

## 2019-12-03 PROCEDURE — 88305 TISSUE EXAM BY PATHOLOGIST: CPT | Mod: 26,,, | Performed by: PATHOLOGY

## 2019-12-03 PROCEDURE — 88305 TISSUE EXAM BY PATHOLOGIST: CPT | Mod: 59 | Performed by: PATHOLOGY

## 2019-12-03 PROCEDURE — 99999 PR PBB SHADOW E&M-EST. PATIENT-LVL III: ICD-10-PCS | Mod: PBBFAC,,, | Performed by: SPECIALIST

## 2019-12-03 PROCEDURE — 57454 PR COLPOSC,CERVIX W/ADJ VAG,W/BX & CURRETAG: ICD-10-PCS | Mod: S$GLB,,, | Performed by: SPECIALIST

## 2019-12-03 PROCEDURE — 99203 PR OFFICE/OUTPT VISIT, NEW, LEVL III, 30-44 MIN: ICD-10-PCS | Mod: 25,S$GLB,, | Performed by: SPECIALIST

## 2019-12-03 PROCEDURE — 57454 BX/CURETT OF CERVIX W/SCOPE: CPT | Mod: S$GLB,,, | Performed by: SPECIALIST

## 2019-12-03 PROCEDURE — 88305 TISSUE EXAM BY PATHOLOGIST: ICD-10-PCS | Mod: 26,,, | Performed by: PATHOLOGY

## 2019-12-03 PROCEDURE — 99203 OFFICE O/P NEW LOW 30 MIN: CPT | Mod: 25,S$GLB,, | Performed by: SPECIALIST

## 2019-12-03 PROCEDURE — 99999 PR PBB SHADOW E&M-EST. PATIENT-LVL III: CPT | Mod: PBBFAC,,, | Performed by: SPECIALIST

## 2019-12-03 RX ORDER — NORGESTIMATE AND ETHINYL ESTRADIOL 7DAYSX3 LO
KIT ORAL
COMMUNITY
Start: 2019-06-21 | End: 2020-02-05

## 2019-12-03 NOTE — LETTER
December 3, 2019      Ladi Noyola MD  [address] [facility] [address]  [facility] [address]           [facility]  [address] [place] [id number]  Phone: [phone]  Fax: [phone]          Patient: Fabienne Cummings   MR Number: [id number]   YOB: 1980   Date of Visit: 12/3/2019       Dear Dr. Ladi Noyola:    Thank you for referring Fabienne Cummings to me for evaluation. Attached you will find relevant portions of my assessment and plan of care.    If you have questions, please do not hesitate to call me. I look forward to following Fabienne Cummings along with you.    Sincerely,    Leobardo Ybarra MD    Enclosure  CC:  No Recipients    If you would like to receive this communication electronically, please contact [email or web address] or [phone] to request more information on [facility] Link access.    For providers and/or their staff who would like to refer a patient to [facility], please contact us through our one-stop-shop provider referral line, [facility], at [phone].    If you feel you have received this communication in error or would no longer like to receive these types of communications, please e-mail [email or web address]

## 2019-12-03 NOTE — PROGRESS NOTES
40 yo BF presents for evaluation recent LGSIL with pos HPV.   I discussed cervical dysplasia as well as HPV subtypes and thus recommend colposcopic exam. Pt is agreeable  Past Medical History:   Diagnosis Date    Allergy        Past Surgical History:   Procedure Laterality Date    TONSILLECTOMY         Family History   Problem Relation Age of Onset    Diabetes Mother     Hypertension Mother     Hypertension Maternal Grandmother     Diabetes Maternal Grandmother     Breast cancer Maternal Grandmother 65    Hypertension Paternal Grandmother        Social History     Socioeconomic History    Marital status: Single     Spouse name: Not on file    Number of children: Not on file    Years of education: Not on file    Highest education level: Not on file   Occupational History    Not on file   Social Needs    Financial resource strain: Somewhat hard    Food insecurity:     Worry: Never true     Inability: Never true    Transportation needs:     Medical: No     Non-medical: No   Tobacco Use    Smoking status: Never Smoker   Substance and Sexual Activity    Alcohol use: Yes     Frequency: 2-4 times a month     Drinks per session: 3 or 4     Binge frequency: Less than monthly    Drug use: No    Sexual activity: Yes   Lifestyle    Physical activity:     Days per week: 3 days     Minutes per session: 60 min    Stress: Only a little   Relationships    Social connections:     Talks on phone: Twice a week     Gets together: Twice a week     Attends Baptism service: Not on file     Active member of club or organization: Yes     Attends meetings of clubs or organizations: 1 to 4 times per year     Relationship status: Never    Other Topics Concern    Not on file   Social History Narrative    Not on file       Current Outpatient Medications   Medication Sig Dispense Refill    norgestimate-ethinyl estradiol (ORTHO TRI-CYCLEN LO) 0.18/0.215/0.25 mg-25 mcg tablet       azelastine (ASTELIN) 137 mcg  (0.1 %) nasal spray 1 spray by Nasal route 2 (two) times daily.      azelastine (OPTIVAR) 0.05 % ophthalmic solution 1 drop 2 (two) times daily.      mometasone (NASONEX) 50 mcg/actuation nasal spray by Nasal route once daily.      montelukast (SINGULAIR) 10 mg tablet Take 10 mg by mouth every evening.       No current facility-administered medications for this visit.        Review of patient's allergies indicates:   Allergen Reactions    Amoxicillin-pot clavula (bulk)     Darvocet a500 [propoxyphene n-acetaminophen]        Review of System:   General: no chills, fever, night sweats, weight gain or weight loss  Psychological: no depression or suicidal ideation  Breasts: no new or changing breast lumps, nipple discharge or masses.  Respiratory: no cough, shortness of breath, or wheezing  Cardiovascular: no chest pain or dyspnea on exertion  Gastrointestinal: no abdominal pain, change in bowel habits, or black or bloody stools  Genito-Urinary: no incontinence, urinary frequency/urgency or vulvar/vaginal symptoms, pelvic pain or abnormal vaginal bleeding.  Musculoskeletal: no gait disturbance or muscular weakness      PRE-COLPOSCOPY PROCEDURE COUNSELING:  Discussed the abnormal pap test findings, HPV, need for colposcopy and possible biopsies to determine a diagnosis and plan of care, treatments available, the minimal risks of bleeding and infection with a colposcopy, alternatives to colposcopy and she agrees to proceed.    Transformation zone------visualized  cervical lesion present-----no  aceto white-------no  punctation---------no  cobblestone-------no    Biopsy position------12 oclock ectocervix  ECC-------submitted    Specimens placed in formalin and sent to pathology. Monsel's solution was applied at biopsy sites to stop bleeding. The speculum was removed.    POST COLPOSCOPY COUNSELING:   Manage post colposcopy cramping with NSAIDs, Tylenol or Rx per [facility].  Avoid anything in vagina (intercourse,  douching, tampons) one week after the procedure.  Expect a clumpy blackish vaginal discharge (Monsel's solution) for several days; Report bleeding heavier than a period, worsening pain, fever > 101.0 F, or foul-smelling vaginal discharge; Stressed importance of follow-up.    Counseling lasted approximately 15 minutes and all her questions were answered.  I will follow results  Possible cryotherapy

## 2019-12-20 LAB
FINAL PATHOLOGIC DIAGNOSIS: NORMAL
GROSS: NORMAL

## 2019-12-24 ENCOUNTER — TELEPHONE (OUTPATIENT)
Dept: OBSTETRICS AND GYNECOLOGY | Facility: CLINIC | Age: 39
End: 2019-12-24

## 2019-12-24 NOTE — TELEPHONE ENCOUNTER
----- Message from Paulina Malcolm sent at 12/24/2019 11:17 AM CST -----  Contact: patient  Type:  Patient Returning Call    Who Called:  patient  Who Left Message for Patient:  Not known just lvm stating office has results  Does the patient know what this is regarding?: yes  Best Call Back Number:  [phone]  Additional Information:  na

## 2020-01-06 ENCOUNTER — OFFICE VISIT (OUTPATIENT)
Dept: OBSTETRICS AND GYNECOLOGY | Facility: CLINIC | Age: 40
End: 2020-01-06
Payer: COMMERCIAL

## 2020-01-06 VITALS
HEIGHT: 65 IN | WEIGHT: 240.94 LBS | BODY MASS INDEX: 40.14 KG/M2 | SYSTOLIC BLOOD PRESSURE: 134 MMHG | DIASTOLIC BLOOD PRESSURE: 82 MMHG

## 2020-01-06 DIAGNOSIS — Z01.812 PRE-PROCEDURE LAB EXAM: Primary | ICD-10-CM

## 2020-01-06 DIAGNOSIS — R87.612 LGSIL ON PAP SMEAR OF CERVIX: ICD-10-CM

## 2020-01-06 LAB
B-HCG UR QL: NEGATIVE
CTP QC/QA: YES

## 2020-01-06 PROCEDURE — 57511 CRYOCAUTERY OF CERVIX: CPT | Mod: S$GLB,,, | Performed by: SPECIALIST

## 2020-01-06 PROCEDURE — 81025 POCT URINE PREGNANCY: ICD-10-PCS | Mod: S$GLB,,, | Performed by: SPECIALIST

## 2020-01-06 PROCEDURE — 99213 OFFICE O/P EST LOW 20 MIN: CPT | Mod: 25,S$GLB,, | Performed by: SPECIALIST

## 2020-01-06 PROCEDURE — 3008F BODY MASS INDEX DOCD: CPT | Mod: CPTII,S$GLB,, | Performed by: SPECIALIST

## 2020-01-06 PROCEDURE — 3008F PR BODY MASS INDEX (BMI) DOCUMENTED: ICD-10-PCS | Mod: CPTII,S$GLB,, | Performed by: SPECIALIST

## 2020-01-06 PROCEDURE — 99213 PR OFFICE/OUTPT VISIT, EST, LEVL III, 20-29 MIN: ICD-10-PCS | Mod: 25,S$GLB,, | Performed by: SPECIALIST

## 2020-01-06 PROCEDURE — 99999 PR PBB SHADOW E&M-EST. PATIENT-LVL III: ICD-10-PCS | Mod: PBBFAC,,, | Performed by: SPECIALIST

## 2020-01-06 PROCEDURE — 57511 PR CRYOCAUTERY OF CERVIX: ICD-10-PCS | Mod: S$GLB,,, | Performed by: SPECIALIST

## 2020-01-06 PROCEDURE — 99999 PR PBB SHADOW E&M-EST. PATIENT-LVL III: CPT | Mod: PBBFAC,,, | Performed by: SPECIALIST

## 2020-01-06 PROCEDURE — 81025 URINE PREGNANCY TEST: CPT | Mod: S$GLB,,, | Performed by: SPECIALIST

## 2020-01-06 NOTE — PROGRESS NOTES
40 yo BF returns for rec cervical cryotx for LGSIL.  Past Medical History:   Diagnosis Date    Abnormal Pap smear of cervix     Allergy        Past Surgical History:   Procedure Laterality Date    TONSILLECTOMY         Family History   Problem Relation Age of Onset    Diabetes Mother     Hypertension Mother     Hypertension Maternal Grandmother     Diabetes Maternal Grandmother     Breast cancer Maternal Grandmother 65    Hypertension Paternal Grandmother     Ovarian cancer Neg Hx        Social History     Socioeconomic History    Marital status: Single     Spouse name: Not on file    Number of children: Not on file    Years of education: Not on file    Highest education level: Not on file   Occupational History    Not on file   Social Needs    Financial resource strain: Somewhat hard    Food insecurity:     Worry: Never true     Inability: Never true    Transportation needs:     Medical: No     Non-medical: No   Tobacco Use    Smoking status: Never Smoker    Smokeless tobacco: Never Used   Substance and Sexual Activity    Alcohol use: Yes     Frequency: 2-4 times a month     Drinks per session: 3 or 4     Binge frequency: Less than monthly    Drug use: No    Sexual activity: Yes     Partners: Male     Birth control/protection: OCP   Lifestyle    Physical activity:     Days per week: 3 days     Minutes per session: 60 min    Stress: Only a little   Relationships    Social connections:     Talks on phone: Twice a week     Gets together: Twice a week     Attends Congregation service: Not on file     Active member of club or organization: Yes     Attends meetings of clubs or organizations: 1 to 4 times per year     Relationship status: Never    Other Topics Concern    Not on file   Social History Narrative    Not on file       Current Outpatient Medications   Medication Sig Dispense Refill    azelastine (ASTELIN) 137 mcg (0.1 %) nasal spray 1 spray by Nasal route 2 (two) times daily.       azelastine (OPTIVAR) 0.05 % ophthalmic solution 1 drop 2 (two) times daily.      mometasone (NASONEX) 50 mcg/actuation nasal spray by Nasal route once daily.      montelukast (SINGULAIR) 10 mg tablet Take 10 mg by mouth every evening.      norgestimate-ethinyl estradiol (ORTHO TRI-CYCLEN LO) 0.18/0.215/0.25 mg-25 mcg tablet        No current facility-administered medications for this visit.        Review of patient's allergies indicates:   Allergen Reactions    Amoxicillin-pot clavula (bulk)     Darvocet a500 [propoxyphene n-acetaminophen]        Review of System:   General: no chills, fever, night sweats, weight gain or weight loss  Psychological: no depression or suicidal ideation  Breasts: no new or changing breast lumps, nipple discharge or masses.  Respiratory: no cough, shortness of breath, or wheezing  Cardiovascular: no chest pain or dyspnea on exertion  Gastrointestinal: no abdominal pain, change in bowel habits, or black or bloody stools  Genito-Urinary: no incontinence, urinary frequency/urgency or vulvar/vaginal symptoms, pelvic pain or abnormal vaginal bleeding.  Musculoskeletal: no gait disturbance or muscular weakness    Procedure Cervical cryocautery  After informed consent, speculum applied and cervix clearly visualized. TZ visiable.  Small cryobell chosen and applied to cervix. 3-5-3 min cycle applied and completed .  Well tolerated.    Plan pelvic rest x 1 week  RTO 6 months repeat pap1 /2  At the end of patient visit, nurse and MD both asked pt if any improvements needed in visit experience and asked whether any further pt questions or concerns.

## 2020-02-05 RX ORDER — NORGESTIMATE AND ETHINYL ESTRADIOL 7DAYSX3 LO
KIT ORAL
Qty: 84 TABLET | Refills: 3 | Status: SHIPPED | OUTPATIENT
Start: 2020-02-05 | End: 2021-02-27

## 2020-02-05 NOTE — PROGRESS NOTES
Refill Authorization Note     is requesting a refill authorization.    Brief assessment and rationale for refill: APPROVE: prr          Medication Therapy Plan: LOV(11/19); Last mammogram 5/19; Approve 12 more                              Comments:     Requested Prescriptions   Signed Prescriptions Disp Refills    TRI-LO-SPRINTEC 0.18/0.215/0.25 mg-25 mcg tablet 84 tablet 3     Sig: TAKE 1 TABLET BY MOUTH EVERY DAY       Oral Contraceptives Protocol Passed - 2/3/2020  1:34 PM        Passed - Patient is not currently pregnant        Passed - No positive pregnancy test in the past 12 months        Passed - Not a current smoker        Passed - Up to date with pap smear Health Maintenance        Passed - Visit with authorizing provider in past 12 months or upcoming 90 days         Passed - Patient does not have contraindications diagnosis        Passed - Blood Pressure below 139/89 on file in past 12 months      BP Readings from Last 3 Encounters:   01/06/20 134/82   12/03/19 114/68   11/15/19 134/78             Passed - Mammogram in past 12 months         Passed - No history of Carcinoma of Breast

## 2020-06-29 ENCOUNTER — TELEPHONE (OUTPATIENT)
Dept: FAMILY MEDICINE | Facility: CLINIC | Age: 40
End: 2020-06-29

## 2020-06-29 DIAGNOSIS — Z12.39 SCREENING FOR BREAST CANCER: Primary | ICD-10-CM

## 2020-06-29 NOTE — TELEPHONE ENCOUNTER
----- Message from Duane Herrera sent at 6/29/2020  4:10 PM CDT -----  Type:  Mammogram    Caller is requesting to schedule their annual mammogram appointment.  Order is not listed in EPIC.  Please enter order and contact patient to schedule.    Name of Caller:  Patient  Where would they like the mammogram performed?  [facility] in [facility] Call Back Number:  [phone]  Additional Information: NA

## 2020-07-02 ENCOUNTER — HOSPITAL ENCOUNTER (OUTPATIENT)
Dept: RADIOLOGY | Facility: HOSPITAL | Age: 40
Discharge: HOME | End: 2020-07-02
Attending: FAMILY MEDICINE
Payer: COMMERCIAL

## 2020-07-02 VITALS — HEIGHT: 65 IN | BODY MASS INDEX: 40.14 KG/M2 | WEIGHT: 240.94 LBS

## 2020-07-02 DIAGNOSIS — Z12.39 SCREENING FOR BREAST CANCER: ICD-10-CM

## 2020-07-02 DIAGNOSIS — Z12.31 VISIT FOR SCREENING MAMMOGRAM: ICD-10-CM

## 2020-07-02 PROCEDURE — 77063 MAMMO DIGITAL SCREENING BILAT WITH TOMOSYNTHESIS_CAD: ICD-10-PCS | Mod: 26,,, | Performed by: RADIOLOGY

## 2020-07-02 PROCEDURE — 77067 MAMMO DIGITAL SCREENING BILAT WITH TOMOSYNTHESIS_CAD: ICD-10-PCS | Mod: 26,,, | Performed by: RADIOLOGY

## 2020-07-02 PROCEDURE — 77063 BREAST TOMOSYNTHESIS BI: CPT | Mod: 26,,, | Performed by: RADIOLOGY

## 2020-07-02 PROCEDURE — 77067 SCR MAMMO BI INCL CAD: CPT | Mod: TC,PN

## 2020-07-02 PROCEDURE — 77067 SCR MAMMO BI INCL CAD: CPT | Mod: 26,,, | Performed by: RADIOLOGY

## 2020-07-13 ENCOUNTER — OFFICE VISIT (OUTPATIENT)
Dept: OBSTETRICS AND GYNECOLOGY | Facility: CLINIC | Age: 40
End: 2020-07-13
Payer: COMMERCIAL

## 2020-07-13 VITALS
WEIGHT: 230.81 LBS | SYSTOLIC BLOOD PRESSURE: 152 MMHG | BODY MASS INDEX: 38.45 KG/M2 | HEIGHT: 65 IN | DIASTOLIC BLOOD PRESSURE: 90 MMHG

## 2020-07-13 DIAGNOSIS — R87.612 LGSIL ON PAP SMEAR OF CERVIX: ICD-10-CM

## 2020-07-13 DIAGNOSIS — Z12.4 PAP SMEAR FOR CERVICAL CANCER SCREENING: Primary | ICD-10-CM

## 2020-07-13 PROCEDURE — 3008F PR BODY MASS INDEX (BMI) DOCUMENTED: ICD-10-PCS | Mod: CPTII,S$GLB,, | Performed by: SPECIALIST

## 2020-07-13 PROCEDURE — 3008F BODY MASS INDEX DOCD: CPT | Mod: CPTII,S$GLB,, | Performed by: SPECIALIST

## 2020-07-13 PROCEDURE — 99213 PR OFFICE/OUTPT VISIT, EST, LEVL III, 20-29 MIN: ICD-10-PCS | Mod: S$GLB,,, | Performed by: SPECIALIST

## 2020-07-13 PROCEDURE — 99213 OFFICE O/P EST LOW 20 MIN: CPT | Mod: S$GLB,,, | Performed by: SPECIALIST

## 2020-07-13 PROCEDURE — 99999 PR PBB SHADOW E&M-EST. PATIENT-LVL III: ICD-10-PCS | Mod: PBBFAC,,, | Performed by: SPECIALIST

## 2020-07-13 PROCEDURE — 99999 PR PBB SHADOW E&M-EST. PATIENT-LVL III: CPT | Mod: PBBFAC,,, | Performed by: SPECIALIST

## 2020-07-13 PROCEDURE — 88175 CYTOPATH C/V AUTO FLUID REDO: CPT

## 2020-07-13 NOTE — PROGRESS NOTES
39 yo BF with H/P LGSIL returns for recommenned repeat pap screening    Past Medical History:   Diagnosis Date    Abnormal Pap smear of cervix     Allergy        Past Surgical History:   Procedure Laterality Date    CERVIX LESION DESTRUCTION      COLPOSCOPY      TONSILLECTOMY         Family History   Problem Relation Age of Onset    Diabetes Mother     Hypertension Mother     Hypertension Maternal Grandmother     Diabetes Maternal Grandmother     Breast cancer Maternal Grandmother 65    Hypertension Paternal Grandmother     Ovarian cancer Neg Hx        Social History     Socioeconomic History    Marital status: Single     Spouse name: Not on file    Number of children: Not on file    Years of education: Not on file    Highest education level: Not on file   Occupational History    Not on file   Social Needs    Financial resource strain: Somewhat hard    Food insecurity     Worry: Never true     Inability: Never true    Transportation needs     Medical: No     Non-medical: No   Tobacco Use    Smoking status: Never Smoker    Smokeless tobacco: Never Used   Substance and Sexual Activity    Alcohol use: Yes     Frequency: 2-4 times a month     Drinks per session: 3 or 4     Binge frequency: Less than monthly    Drug use: No    Sexual activity: Yes     Partners: Male     Birth control/protection: OCP   Lifestyle    Physical activity     Days per week: 3 days     Minutes per session: 60 min    Stress: Only a little   Relationships    Social connections     Talks on phone: Twice a week     Gets together: Twice a week     Attends Worship service: Not on file     Active member of club or organization: Yes     Attends meetings of clubs or organizations: 1 to 4 times per year     Relationship status: Never    Other Topics Concern    Not on file   Social History Narrative    Not on file       Current Outpatient Medications   Medication Sig Dispense Refill    azelastine (ASTELIN) 137 mcg  (0.1 %) nasal spray 1 spray by Nasal route 2 (two) times daily.      azelastine (OPTIVAR) 0.05 % ophthalmic solution 1 drop 2 (two) times daily.      mometasone (NASONEX) 50 mcg/actuation nasal spray by Nasal route once daily.      montelukast (SINGULAIR) 10 mg tablet Take 10 mg by mouth every evening.      TRI-LO-SPRINTEC 0.18/0.215/0.25 mg-25 mcg tablet TAKE 1 TABLET BY MOUTH EVERY DAY 84 tablet 3     No current facility-administered medications for this visit.        Review of patient's allergies indicates:   Allergen Reactions    Amoxicillin-pot clavula (bulk)     Darvocet a500 [propoxyphene n-acetaminophen]        Review of System:   General: no chills, fever, night sweats, weight gain or weight loss  Psychological: no depression or suicidal ideation  Breasts: no new or changing breast lumps, nipple discharge or masses.  Respiratory: no cough, shortness of breath, or wheezing  Cardiovascular: no chest pain or dyspnea on exertion  Gastrointestinal: no abdominal pain, change in bowel habits, or black or bloody stools  Genito-Urinary: no incontinence, urinary frequency/urgency or vulvar/vaginal symptoms, pelvic pain or abnormal vaginal bleeding.  Musculoskeletal: no gait disturbance or muscular weakness                                              General Appearance    A and O x 4, Cooperative, no distress   Breasts    Abdomen   Deferred  Soft, non-tender, bowel sounds active all four quadrants,  no masses, no organomegaly    Genitourinary:   External rectal exam shows no thrombosed external hemorrhoids.   Pelvic exam was performed with patient supine.  No labial fusion.  There is no rash, lesion or injury on the right labia.  There is no rash, lesion or injury on the left labia.  No bleeding and no signs of injury around the vaginal introitus, urethra is without lesions and well supported. The cervix is visualized with no discharge, lesions or friability.  No vaginal discharge found.  No significant  Cystocele, Enterocele or rectocele, and uterus well supported.  Bimanual exam:  The urethra is normal to palpation and there are no palpable vaginal wall masses.  Uterus is not deviated, not enlarged, not fixed, normal shape and not tender.  Cervix exhibits no motion tenderness.   Right adnexum displays no mass and no tenderness.  Left adnexum displays no mass and no tenderness.   Extremities: Extremities normal, atraumatic, no cyanosis or edema                     PAP submitted    Answered all questions and will follow  I reviewed pt's past medical history, past and current meds, family history, allergies and reviewed problem list  I spent 20 min with pt with approx half in consultation

## 2020-07-22 LAB
FINAL PATHOLOGIC DIAGNOSIS: NORMAL
Lab: NORMAL

## 2020-11-16 ENCOUNTER — OFFICE VISIT (OUTPATIENT)
Dept: FAMILY MEDICINE | Facility: CLINIC | Age: 40
End: 2020-11-16
Payer: COMMERCIAL

## 2020-11-16 ENCOUNTER — LAB VISIT (OUTPATIENT)
Dept: LAB | Facility: HOSPITAL | Age: 40
End: 2020-11-16
Attending: FAMILY MEDICINE
Payer: COMMERCIAL

## 2020-11-16 VITALS
WEIGHT: 229.25 LBS | HEART RATE: 77 BPM | DIASTOLIC BLOOD PRESSURE: 78 MMHG | RESPIRATION RATE: 18 BRPM | SYSTOLIC BLOOD PRESSURE: 124 MMHG | BODY MASS INDEX: 38.2 KG/M2 | OXYGEN SATURATION: 100 % | HEIGHT: 65 IN | TEMPERATURE: 99 F

## 2020-11-16 DIAGNOSIS — E66.09 CLASS 2 OBESITY DUE TO EXCESS CALORIES WITHOUT SERIOUS COMORBIDITY WITH BODY MASS INDEX (BMI) OF 38.0 TO 38.9 IN ADULT: ICD-10-CM

## 2020-11-16 DIAGNOSIS — Z11.59 ENCOUNTER FOR HEPATITIS C SCREENING TEST FOR LOW RISK PATIENT: ICD-10-CM

## 2020-11-16 DIAGNOSIS — Z11.4 SCREENING FOR HIV (HUMAN IMMUNODEFICIENCY VIRUS): ICD-10-CM

## 2020-11-16 DIAGNOSIS — Z00.01 ANNUAL VISIT FOR GENERAL ADULT MEDICAL EXAMINATION WITH ABNORMAL FINDINGS: ICD-10-CM

## 2020-11-16 DIAGNOSIS — Z00.01 ANNUAL VISIT FOR GENERAL ADULT MEDICAL EXAMINATION WITH ABNORMAL FINDINGS: Primary | ICD-10-CM

## 2020-11-16 LAB
25(OH)D3+25(OH)D2 SERPL-MCNC: 43 NG/ML (ref 30–96)
ALBUMIN SERPL BCP-MCNC: 4 G/DL (ref 3.5–5.2)
ALP SERPL-CCNC: 85 U/L (ref 55–135)
ALT SERPL W/O P-5'-P-CCNC: 26 U/L (ref 10–44)
ANION GAP SERPL CALC-SCNC: 11 MMOL/L (ref 8–16)
AST SERPL-CCNC: 55 U/L (ref 10–40)
BASOPHILS # BLD AUTO: 0.05 K/UL (ref 0–0.2)
BASOPHILS NFR BLD: 0.7 % (ref 0–1.9)
BILIRUB SERPL-MCNC: 0.5 MG/DL (ref 0.1–1)
BUN SERPL-MCNC: 13 MG/DL (ref 6–20)
CALCIUM SERPL-MCNC: 9.7 MG/DL (ref 8.7–10.5)
CHLORIDE SERPL-SCNC: 106 MMOL/L (ref 95–110)
CHOLEST SERPL-MCNC: 266 MG/DL (ref 120–199)
CHOLEST/HDLC SERPL: 3.6 {RATIO} (ref 2–5)
CO2 SERPL-SCNC: 26 MMOL/L (ref 23–29)
CREAT SERPL-MCNC: 1.1 MG/DL (ref 0.5–1.4)
DIFFERENTIAL METHOD: ABNORMAL
EOSINOPHIL # BLD AUTO: 0.1 K/UL (ref 0–0.5)
EOSINOPHIL NFR BLD: 1.6 % (ref 0–8)
ERYTHROCYTE [DISTWIDTH] IN BLOOD BY AUTOMATED COUNT: 13.2 % (ref 11.5–14.5)
EST. GFR  (AFRICAN AMERICAN): >60 ML/MIN/1.73 M^2
EST. GFR  (NON AFRICAN AMERICAN): >60 ML/MIN/1.73 M^2
GLUCOSE SERPL-MCNC: 108 MG/DL (ref 70–110)
HCT VFR BLD AUTO: 45.8 % (ref 37–48.5)
HDLC SERPL-MCNC: 74 MG/DL (ref 40–75)
HDLC SERPL: 27.8 % (ref 20–50)
HGB BLD-MCNC: 14.2 G/DL (ref 12–16)
IMM GRANULOCYTES # BLD AUTO: 0.02 K/UL (ref 0–0.04)
IMM GRANULOCYTES NFR BLD AUTO: 0.3 % (ref 0–0.5)
INSULIN COLLECTION INTERVAL: NORMAL
INSULIN SERPL-ACNC: 12 UU/ML
LDLC SERPL CALC-MCNC: 171.8 MG/DL (ref 63–159)
LYMPHOCYTES # BLD AUTO: 2.2 K/UL (ref 1–4.8)
LYMPHOCYTES NFR BLD: 29.7 % (ref 18–48)
MCH RBC QN AUTO: 31.5 PG (ref 27–31)
MCHC RBC AUTO-ENTMCNC: 31 G/DL (ref 32–36)
MCV RBC AUTO: 102 FL (ref 82–98)
MONOCYTES # BLD AUTO: 0.3 K/UL (ref 0.3–1)
MONOCYTES NFR BLD: 3.8 % (ref 4–15)
NEUTROPHILS # BLD AUTO: 4.8 K/UL (ref 1.8–7.7)
NEUTROPHILS NFR BLD: 63.9 % (ref 38–73)
NONHDLC SERPL-MCNC: 192 MG/DL
NRBC BLD-RTO: 0 /100 WBC
PLATELET # BLD AUTO: 264 K/UL (ref 150–350)
PMV BLD AUTO: 11.1 FL (ref 9.2–12.9)
POTASSIUM SERPL-SCNC: 4.3 MMOL/L (ref 3.5–5.1)
PROT SERPL-MCNC: 7.9 G/DL (ref 6–8.4)
RBC # BLD AUTO: 4.51 M/UL (ref 4–5.4)
SODIUM SERPL-SCNC: 143 MMOL/L (ref 136–145)
TRIGL SERPL-MCNC: 101 MG/DL (ref 30–150)
TSH SERPL DL<=0.005 MIU/L-ACNC: 1.21 UIU/ML (ref 0.4–4)
WBC # BLD AUTO: 7.45 K/UL (ref 3.9–12.7)

## 2020-11-16 PROCEDURE — 99396 PREV VISIT EST AGE 40-64: CPT | Mod: 25,S$GLB,, | Performed by: FAMILY MEDICINE

## 2020-11-16 PROCEDURE — 99999 PR PBB SHADOW E&M-EST. PATIENT-LVL IV: ICD-10-PCS | Mod: PBBFAC,,, | Performed by: FAMILY MEDICINE

## 2020-11-16 PROCEDURE — 99396 PR PREVENTIVE VISIT,EST,40-64: ICD-10-PCS | Mod: 25,S$GLB,, | Performed by: FAMILY MEDICINE

## 2020-11-16 PROCEDURE — 90471 FLU VACCINE (QUAD) GREATER THAN OR EQUAL TO 3YO PRESERVATIVE FREE IM: ICD-10-PCS | Mod: S$GLB,,, | Performed by: FAMILY MEDICINE

## 2020-11-16 PROCEDURE — 80061 LIPID PANEL: CPT

## 2020-11-16 PROCEDURE — 1126F PR PAIN SEVERITY QUANTIFIED, NO PAIN PRESENT: ICD-10-PCS | Mod: S$GLB,,, | Performed by: FAMILY MEDICINE

## 2020-11-16 PROCEDURE — 90471 IMMUNIZATION ADMIN: CPT | Mod: S$GLB,,, | Performed by: FAMILY MEDICINE

## 2020-11-16 PROCEDURE — 84443 ASSAY THYROID STIM HORMONE: CPT

## 2020-11-16 PROCEDURE — 3008F BODY MASS INDEX DOCD: CPT | Mod: CPTII,S$GLB,, | Performed by: FAMILY MEDICINE

## 2020-11-16 PROCEDURE — 85025 COMPLETE CBC W/AUTO DIFF WBC: CPT

## 2020-11-16 PROCEDURE — 1126F AMNT PAIN NOTED NONE PRSNT: CPT | Mod: S$GLB,,, | Performed by: FAMILY MEDICINE

## 2020-11-16 PROCEDURE — 83525 ASSAY OF INSULIN: CPT

## 2020-11-16 PROCEDURE — 86703 HIV-1/HIV-2 1 RESULT ANTBDY: CPT

## 2020-11-16 PROCEDURE — 99999 PR PBB SHADOW E&M-EST. PATIENT-LVL IV: CPT | Mod: PBBFAC,,, | Performed by: FAMILY MEDICINE

## 2020-11-16 PROCEDURE — 80053 COMPREHEN METABOLIC PANEL: CPT

## 2020-11-16 PROCEDURE — 90686 FLU VACCINE (QUAD) GREATER THAN OR EQUAL TO 3YO PRESERVATIVE FREE IM: ICD-10-PCS | Mod: S$GLB,,, | Performed by: FAMILY MEDICINE

## 2020-11-16 PROCEDURE — 90686 IIV4 VACC NO PRSV 0.5 ML IM: CPT | Mod: S$GLB,,, | Performed by: FAMILY MEDICINE

## 2020-11-16 PROCEDURE — 86803 HEPATITIS C AB TEST: CPT

## 2020-11-16 PROCEDURE — 3008F PR BODY MASS INDEX (BMI) DOCUMENTED: ICD-10-PCS | Mod: CPTII,S$GLB,, | Performed by: FAMILY MEDICINE

## 2020-11-16 PROCEDURE — 82306 VITAMIN D 25 HYDROXY: CPT

## 2020-11-16 NOTE — PATIENT INSTRUCTIONS
Eating Heart-Healthy Food: Using the DASH Plan    Eating for your heart doesnt have to be hard or boring. You just need to know how to make healthier choices. The DASH eating plan has been developed to help you do just that. DASH stands for Dietary Approaches to Stop Hypertension. It is a plan that has been proven to be healthier for your heart and to lower your risk for high blood pressure. It can also help lower your risk for cancer, heart disease, osteoporosis, and diabetes.  Choosing from each food group  Choose foods from each of the food groups below each day. Try to get the recommended number of servings for each food group. The serving numbers are based on a diet of 2,000 calories a day. Talk to your doctor if youre unsure about your calorie needs. Along with getting the correct servings, the DASH plan also recommends a sodium intake less than 2,300 mg per day.        Grains  Servings: 6 to 8 a day  A serving is:  · 1 slice bread  · 1 ounce dry cereal  · Half a cup cooked rice, pasta or cereal  Best choices: Whole grains and any grains high in fiber. Vegetables  Servings: 4 to 5 a day  A serving is:  · 1 cup raw leafy vegetable  · Half a cup cut-up raw or cooked vegetable  · Half a cup vegetable juice  Best choices: Fresh or frozen vegetables prepared without added salt or fat.   Fruits  Servings: 4 to 5 a day  A serving is:  · 1 medium fruit  · One-quarter cup dried fruit  · Half a cup fresh, frozen, or canned fruit  · Half a cup of 100% fruit juices  Best choices: A variety of fresh fruits of different colors. Whole fruits are a better choice than fruit juices. Low-fat or fat-free dairy  Servings: 2 to 3 a day  A serving is:  · 1 cup milk  · 1 cup yogurt  · One and a half ounces cheese  Best choices: Skim or 1% milk, low-fat or fat-free yogurt or buttermilk, and low-fat cheeses.         Lean meats, poultry, fish  Servings: 6 or fewer a day  A serving is:  · 1 ounce cooked meats, poultry, or fish  · 1  egg  Best choices: Lean poultry and fish. Trim away visible fat. Broil, grill, roast, or boil instead of frying. Remove skin from poultry before eating. Limit how much red meat you eat.  Nuts, seeds, beans  Servings: 4 to 5 a week  A serving is:  · One-third cup nuts (one and a half ounces)  · 2 tablespoons nut butter or seeds  · Half a cup cooked dry beans or legumes  Best choices: Dry roasted nuts with no salt added, lentils, kidney beans, garbanzo beans, and whole durán beans.   Fats and oils  Servings: 2 to 3 a day  A serving is:  · 1 teaspoon vegetable oil  · 1 teaspoon soft margarine  · 1 tablespoon mayonnaise  · 2 tablespoons salad dressing  Best choices: Nut and vegetable oils (nontropical vegetable oils), such as olive and canola oil. Sweets  Servings: 5 a week or fewer  A serving is:  · 1 tablespoon sugar, maple syrup, or honey  · 1 tablespoon jam or jelly  · 1 half-ounce jelly beans (about 15)  · 1 cup lemonade  Best choices: Dried fruit can be a satisfying sweet. Choose low-fat sweets. And watch your serving sizes!      For more on the DASH eating plan, visit:  www.nhlbi.nih.gov/health/health-topics/topics/dash   Date Last Reviewed: 6/1/2016  © 0435-3394 [employer]. [address] [place] [address], [place] [address]. All rights reserved. This information is not intended as a substitute for professional medical care. Always follow your healthcare professional's instructions.

## 2020-11-16 NOTE — PROGRESS NOTES
Subjective:       Patient ID: Fabienne Cummings is a 40 y.o. female.    Chief Complaint: Annual Exam (wants thyroid level checked )    HPI     The patient is coming here today for an annual checkup.  The patient stated that she wants her thyroid to be checked, this was check in 2018 and was normal, the patient stated that she has strong family history of hypothyroidism.  The patient stated that she lost 8 lb since her last office visit as she has been trying to exercise and eat healthier.  She has problems with her Achillis tendon and is currently using new inserts that were prescribed by the orthopedic specialist and is helping.  The patient stated that sometimes she exercises 3 times a week sometimes 5 times a week if is not having too much problems with her Achillis tendon.    Past medical history, past social history was reviewed and discussed with the patient.    Review of Systems   Constitutional: Positive for activity change. Negative for unexpected weight change.   HENT: Negative for hearing loss, rhinorrhea and trouble swallowing.    Eyes: Negative for discharge and visual disturbance.   Respiratory: Negative for chest tightness and wheezing.    Cardiovascular: Negative for chest pain and palpitations.   Gastrointestinal: Negative for blood in stool, constipation, diarrhea and vomiting.   Endocrine: Negative for polydipsia and polyuria.   Genitourinary: Negative for difficulty urinating, dysuria, hematuria and menstrual problem.   Musculoskeletal: Negative for arthralgias, joint swelling and neck pain.   Neurological: Positive for headaches. Negative for weakness.   Psychiatric/Behavioral: Negative for confusion and dysphoric mood.       Objective:      Physical Exam  Vitals signs and nursing note reviewed.   Constitutional:       General: She is not in acute distress.     Appearance: She is well-developed. She is obese. She is not diaphoretic.   HENT:      Head: Normocephalic and atraumatic.      Right  Ear: External ear normal.      Left Ear: External ear normal.      Nose: Nose normal.      Mouth/Throat:      Pharynx: No oropharyngeal exudate.   Eyes:      General:         Right eye: No discharge.         Left eye: No discharge.      Conjunctiva/sclera: Conjunctivae normal.      Pupils: Pupils are equal, round, and reactive to light.   Neck:      Musculoskeletal: Neck supple.   Cardiovascular:      Rate and Rhythm: Normal rate and regular rhythm.      Heart sounds: Normal heart sounds. No murmur.   Pulmonary:      Effort: Pulmonary effort is normal. No respiratory distress.      Breath sounds: Normal breath sounds. No wheezing.   Abdominal:      General: There is no distension.      Palpations: There is no mass.      Tenderness: There is no abdominal tenderness.   Musculoskeletal:         General: No tenderness or deformity.   Skin:     Coloration: Skin is not pale.      Findings: No erythema.   Neurological:      Cranial Nerves: No cranial nerve deficit.      Coordination: Coordination normal.   Psychiatric:         Behavior: Behavior normal.         Thought Content: Thought content normal.         Judgment: Judgment normal.         Assessment:       1. Annual visit for general adult medical examination with abnormal findings    2. Screening for HIV (human immunodeficiency virus)    3. Encounter for hepatitis C screening test for low risk patient    4. Class 2 obesity due to excess calories without serious comorbidity with body mass index (BMI) of 38.0 to 38.9 in adult        Plan:       Annual visit for general adult medical examination with abnormal findings  -     CBC Auto Differential; Future; Expected date: 11/16/2020  -     Comprehensive Metabolic Panel; Future; Expected date: 11/16/2020  -     Lipid Panel; Future; Expected date: 11/16/2020  -     TSH; Future; Expected date: 11/16/2020  -     HIV 1/2 Ag/Ab (4th Gen); Future; Expected date: 11/16/2020  -     Hepatitis C Antibody; Future; Expected date:  11/16/2020  -     Vitamin D; Future; Expected date: 11/16/2020  -     INSULIN, RANDOM; Future; Expected date: 11/16/2020    Screening for HIV (human immunodeficiency virus)  -     HIV 1/2 Ag/Ab (4th Gen); Future; Expected date: 11/16/2020    Encounter for hepatitis C screening test for low risk patient  -     Hepatitis C Antibody; Future; Expected date: 11/16/2020    Class 2 obesity due to excess calories without serious comorbidity with body mass index (BMI) of 38.0 to 38.9 in adult:  Improved  -     Vitamin D; Future; Expected date: 11/16/2020  -     INSULIN, RANDOM; Future; Expected date: 11/16/2020    Will call the patient after have the results of the test, continue healthy habits and continue exercising as tolerated.  The patient's BMI has been recorded in the chart. The patient has been provided educational materials regarding the benefits of attaining and maintaining a normal weight. We will continue to address and follow this issue during follow up visits.   Patient agreed with assessment and plan. Patient verbalized understanding.

## 2020-11-17 DIAGNOSIS — R71.8 ELEVATED MCV: ICD-10-CM

## 2020-11-17 DIAGNOSIS — R74.8 INCREASED LIVER ENZYMES: Primary | ICD-10-CM

## 2020-11-17 LAB
HCV AB SERPL QL IA: NEGATIVE
HIV 1+2 AB+HIV1 P24 AG SERPL QL IA: NEGATIVE

## 2020-12-31 ENCOUNTER — LAB VISIT (OUTPATIENT)
Dept: LAB | Facility: HOSPITAL | Age: 40
End: 2020-12-31
Attending: FAMILY MEDICINE
Payer: COMMERCIAL

## 2020-12-31 DIAGNOSIS — R71.8 ELEVATED MCV: ICD-10-CM

## 2020-12-31 DIAGNOSIS — R74.8 INCREASED LIVER ENZYMES: ICD-10-CM

## 2020-12-31 LAB
ALBUMIN SERPL BCP-MCNC: 3.8 G/DL (ref 3.5–5.2)
ALP SERPL-CCNC: 125 U/L (ref 55–135)
ALT SERPL W/O P-5'-P-CCNC: 24 U/L (ref 10–44)
ANION GAP SERPL CALC-SCNC: 13 MMOL/L (ref 8–16)
AST SERPL-CCNC: 27 U/L (ref 10–40)
BASOPHILS # BLD AUTO: 0.05 K/UL (ref 0–0.2)
BASOPHILS NFR BLD: 0.6 % (ref 0–1.9)
BILIRUB SERPL-MCNC: 0.4 MG/DL (ref 0.1–1)
BUN SERPL-MCNC: 10 MG/DL (ref 6–20)
CALCIUM SERPL-MCNC: 10 MG/DL (ref 8.7–10.5)
CHLORIDE SERPL-SCNC: 107 MMOL/L (ref 95–110)
CO2 SERPL-SCNC: 23 MMOL/L (ref 23–29)
CREAT SERPL-MCNC: 1.2 MG/DL (ref 0.5–1.4)
DIFFERENTIAL METHOD: ABNORMAL
EOSINOPHIL # BLD AUTO: 0.2 K/UL (ref 0–0.5)
EOSINOPHIL NFR BLD: 1.8 % (ref 0–8)
ERYTHROCYTE [DISTWIDTH] IN BLOOD BY AUTOMATED COUNT: 13.5 % (ref 11.5–14.5)
EST. GFR  (AFRICAN AMERICAN): >60 ML/MIN/1.73 M^2
EST. GFR  (NON AFRICAN AMERICAN): 56.3 ML/MIN/1.73 M^2
GLUCOSE SERPL-MCNC: 104 MG/DL (ref 70–110)
HCT VFR BLD AUTO: 44.8 % (ref 37–48.5)
HGB BLD-MCNC: 14.1 G/DL (ref 12–16)
IMM GRANULOCYTES # BLD AUTO: 0.02 K/UL (ref 0–0.04)
IMM GRANULOCYTES NFR BLD AUTO: 0.2 % (ref 0–0.5)
LYMPHOCYTES # BLD AUTO: 2.4 K/UL (ref 1–4.8)
LYMPHOCYTES NFR BLD: 28.5 % (ref 18–48)
MCH RBC QN AUTO: 31.5 PG (ref 27–31)
MCHC RBC AUTO-ENTMCNC: 31.5 G/DL (ref 32–36)
MCV RBC AUTO: 100 FL (ref 82–98)
MONOCYTES # BLD AUTO: 0.5 K/UL (ref 0.3–1)
MONOCYTES NFR BLD: 5.4 % (ref 4–15)
NEUTROPHILS # BLD AUTO: 5.4 K/UL (ref 1.8–7.7)
NEUTROPHILS NFR BLD: 63.5 % (ref 38–73)
NRBC BLD-RTO: 0 /100 WBC
PLATELET # BLD AUTO: 238 K/UL (ref 150–350)
PMV BLD AUTO: 10.9 FL (ref 9.2–12.9)
POTASSIUM SERPL-SCNC: 5.5 MMOL/L (ref 3.5–5.1)
PROT SERPL-MCNC: 7.5 G/DL (ref 6–8.4)
RBC # BLD AUTO: 4.47 M/UL (ref 4–5.4)
SODIUM SERPL-SCNC: 143 MMOL/L (ref 136–145)
WBC # BLD AUTO: 8.54 K/UL (ref 3.9–12.7)

## 2020-12-31 PROCEDURE — 80053 COMPREHEN METABOLIC PANEL: CPT

## 2020-12-31 PROCEDURE — 36415 COLL VENOUS BLD VENIPUNCTURE: CPT | Mod: PO

## 2020-12-31 PROCEDURE — 85025 COMPLETE CBC W/AUTO DIFF WBC: CPT

## 2021-01-04 DIAGNOSIS — E87.5 HYPERKALEMIA: Primary | ICD-10-CM

## 2021-01-25 ENCOUNTER — LAB VISIT (OUTPATIENT)
Dept: LAB | Facility: HOSPITAL | Age: 41
End: 2021-01-25
Attending: FAMILY MEDICINE
Payer: COMMERCIAL

## 2021-01-25 DIAGNOSIS — E87.5 HYPERKALEMIA: ICD-10-CM

## 2021-01-25 LAB
ANION GAP SERPL CALC-SCNC: 10 MMOL/L (ref 8–16)
BUN SERPL-MCNC: 13 MG/DL (ref 6–20)
CALCIUM SERPL-MCNC: 10.1 MG/DL (ref 8.7–10.5)
CHLORIDE SERPL-SCNC: 104 MMOL/L (ref 95–110)
CO2 SERPL-SCNC: 27 MMOL/L (ref 23–29)
CREAT SERPL-MCNC: 0.7 MG/DL (ref 0.5–1.4)
EST. GFR  (AFRICAN AMERICAN): >60 ML/MIN/1.73 M^2
EST. GFR  (NON AFRICAN AMERICAN): >60 ML/MIN/1.73 M^2
GLUCOSE SERPL-MCNC: 97 MG/DL (ref 70–110)
POTASSIUM SERPL-SCNC: 4.9 MMOL/L (ref 3.5–5.1)
SODIUM SERPL-SCNC: 141 MMOL/L (ref 136–145)

## 2021-01-25 PROCEDURE — 36415 COLL VENOUS BLD VENIPUNCTURE: CPT | Mod: PO

## 2021-01-25 PROCEDURE — 80048 BASIC METABOLIC PNL TOTAL CA: CPT

## 2021-03-01 RX ORDER — NORGESTIMATE AND ETHINYL ESTRADIOL 7DAYSX3 LO
KIT ORAL
Qty: 84 TABLET | Refills: 2 | Status: SHIPPED | OUTPATIENT
Start: 2021-03-01 | End: 2022-05-17

## 2021-03-05 ENCOUNTER — PATIENT MESSAGE (OUTPATIENT)
Dept: FAMILY MEDICINE | Facility: CLINIC | Age: 41
End: 2021-03-05

## 2021-03-05 ENCOUNTER — TELEPHONE (OUTPATIENT)
Dept: FAMILY MEDICINE | Facility: CLINIC | Age: 41
End: 2021-03-05

## 2021-03-06 RX ORDER — MONTELUKAST SODIUM 10 MG/1
10 TABLET ORAL NIGHTLY
Qty: 90 TABLET | Refills: 3 | Status: SHIPPED | OUTPATIENT
Start: 2021-03-06 | End: 2022-03-15

## 2021-05-12 ENCOUNTER — PATIENT MESSAGE (OUTPATIENT)
Dept: RESEARCH | Facility: HOSPITAL | Age: 41
End: 2021-05-12

## 2021-07-13 ENCOUNTER — TELEPHONE (OUTPATIENT)
Dept: FAMILY MEDICINE | Facility: CLINIC | Age: 41
End: 2021-07-13

## 2021-07-13 DIAGNOSIS — Z12.31 ENCOUNTER FOR SCREENING MAMMOGRAM FOR BREAST CANCER: Primary | ICD-10-CM

## 2021-09-13 ENCOUNTER — HOSPITAL ENCOUNTER (OUTPATIENT)
Dept: RADIOLOGY | Facility: HOSPITAL | Age: 41
Discharge: HOME | End: 2021-09-13
Attending: FAMILY MEDICINE
Payer: COMMERCIAL

## 2021-09-13 DIAGNOSIS — Z12.31 ENCOUNTER FOR SCREENING MAMMOGRAM FOR BREAST CANCER: ICD-10-CM

## 2021-09-13 PROCEDURE — 77067 SCR MAMMO BI INCL CAD: CPT | Mod: 26,,, | Performed by: RADIOLOGY

## 2021-09-13 PROCEDURE — 77067 SCR MAMMO BI INCL CAD: CPT | Mod: TC,PO

## 2021-09-13 PROCEDURE — 77063 BREAST TOMOSYNTHESIS BI: CPT | Mod: 26,,, | Performed by: RADIOLOGY

## 2021-09-13 PROCEDURE — 77067 MAMMO DIGITAL SCREENING BILAT WITH TOMO: ICD-10-PCS | Mod: 26,,, | Performed by: RADIOLOGY

## 2021-09-13 PROCEDURE — 77063 MAMMO DIGITAL SCREENING BILAT WITH TOMO: ICD-10-PCS | Mod: 26,,, | Performed by: RADIOLOGY

## 2021-11-16 ENCOUNTER — LAB VISIT (OUTPATIENT)
Dept: LAB | Facility: HOSPITAL | Age: 41
End: 2021-11-16
Attending: FAMILY MEDICINE
Payer: COMMERCIAL

## 2021-11-16 ENCOUNTER — OFFICE VISIT (OUTPATIENT)
Dept: FAMILY MEDICINE | Facility: CLINIC | Age: 41
End: 2021-11-16
Payer: COMMERCIAL

## 2021-11-16 VITALS
WEIGHT: 242.94 LBS | OXYGEN SATURATION: 99 % | DIASTOLIC BLOOD PRESSURE: 82 MMHG | HEART RATE: 77 BPM | SYSTOLIC BLOOD PRESSURE: 132 MMHG | BODY MASS INDEX: 40.43 KG/M2

## 2021-11-16 DIAGNOSIS — Z00.01 ANNUAL VISIT FOR GENERAL ADULT MEDICAL EXAMINATION WITH ABNORMAL FINDINGS: Primary | ICD-10-CM

## 2021-11-16 DIAGNOSIS — M25.561 ACUTE PAIN OF RIGHT KNEE: ICD-10-CM

## 2021-11-16 DIAGNOSIS — Z00.01 ANNUAL VISIT FOR GENERAL ADULT MEDICAL EXAMINATION WITH ABNORMAL FINDINGS: ICD-10-CM

## 2021-11-16 DIAGNOSIS — J30.89 NON-SEASONAL ALLERGIC RHINITIS DUE TO OTHER ALLERGIC TRIGGER: ICD-10-CM

## 2021-11-16 LAB
ALBUMIN SERPL BCP-MCNC: 4.1 G/DL (ref 3.5–5.2)
ALP SERPL-CCNC: 131 U/L (ref 55–135)
ALT SERPL W/O P-5'-P-CCNC: 22 U/L (ref 10–44)
ANION GAP SERPL CALC-SCNC: 8 MMOL/L (ref 8–16)
AST SERPL-CCNC: 21 U/L (ref 10–40)
BASOPHILS # BLD AUTO: 0.03 K/UL (ref 0–0.2)
BASOPHILS NFR BLD: 0.4 % (ref 0–1.9)
BILIRUB SERPL-MCNC: 0.7 MG/DL (ref 0.1–1)
BUN SERPL-MCNC: 14 MG/DL (ref 6–20)
CALCIUM SERPL-MCNC: 10 MG/DL (ref 8.7–10.5)
CHLORIDE SERPL-SCNC: 105 MMOL/L (ref 95–110)
CHOLEST SERPL-MCNC: 227 MG/DL (ref 120–199)
CHOLEST/HDLC SERPL: 3.4 {RATIO} (ref 2–5)
CO2 SERPL-SCNC: 28 MMOL/L (ref 23–29)
CREAT SERPL-MCNC: 1.1 MG/DL (ref 0.5–1.4)
DIFFERENTIAL METHOD: ABNORMAL
EOSINOPHIL # BLD AUTO: 0.1 K/UL (ref 0–0.5)
EOSINOPHIL NFR BLD: 1.8 % (ref 0–8)
ERYTHROCYTE [DISTWIDTH] IN BLOOD BY AUTOMATED COUNT: 12.7 % (ref 11.5–14.5)
EST. GFR  (AFRICAN AMERICAN): >60 ML/MIN/1.73 M^2
EST. GFR  (NON AFRICAN AMERICAN): >60 ML/MIN/1.73 M^2
GLUCOSE SERPL-MCNC: 110 MG/DL (ref 70–110)
HCT VFR BLD AUTO: 43.6 % (ref 37–48.5)
HDLC SERPL-MCNC: 66 MG/DL (ref 40–75)
HDLC SERPL: 29.1 % (ref 20–50)
HGB BLD-MCNC: 14.7 G/DL (ref 12–16)
IMM GRANULOCYTES # BLD AUTO: 0.02 K/UL (ref 0–0.04)
IMM GRANULOCYTES NFR BLD AUTO: 0.3 % (ref 0–0.5)
LDLC SERPL CALC-MCNC: 147 MG/DL (ref 63–159)
LYMPHOCYTES # BLD AUTO: 2.5 K/UL (ref 1–4.8)
LYMPHOCYTES NFR BLD: 31.3 % (ref 18–48)
MCH RBC QN AUTO: 32.3 PG (ref 27–31)
MCHC RBC AUTO-ENTMCNC: 33.7 G/DL (ref 32–36)
MCV RBC AUTO: 96 FL (ref 82–98)
MONOCYTES # BLD AUTO: 0.5 K/UL (ref 0.3–1)
MONOCYTES NFR BLD: 5.7 % (ref 4–15)
NEUTROPHILS # BLD AUTO: 4.8 K/UL (ref 1.8–7.7)
NEUTROPHILS NFR BLD: 60.5 % (ref 38–73)
NONHDLC SERPL-MCNC: 161 MG/DL
NRBC BLD-RTO: 0 /100 WBC
PLATELET # BLD AUTO: 252 K/UL (ref 150–450)
PMV BLD AUTO: 10.6 FL (ref 9.2–12.9)
POTASSIUM SERPL-SCNC: 4.7 MMOL/L (ref 3.5–5.1)
PROT SERPL-MCNC: 7.3 G/DL (ref 6–8.4)
RBC # BLD AUTO: 4.55 M/UL (ref 4–5.4)
SODIUM SERPL-SCNC: 141 MMOL/L (ref 136–145)
TRIGL SERPL-MCNC: 70 MG/DL (ref 30–150)
TSH SERPL DL<=0.005 MIU/L-ACNC: 2.3 UIU/ML (ref 0.4–4)
WBC # BLD AUTO: 7.92 K/UL (ref 3.9–12.7)

## 2021-11-16 PROCEDURE — 85025 COMPLETE CBC W/AUTO DIFF WBC: CPT | Performed by: FAMILY MEDICINE

## 2021-11-16 PROCEDURE — 90471 FLU VACCINE (QUAD) GREATER THAN OR EQUAL TO 3YO PRESERVATIVE FREE IM: ICD-10-PCS | Mod: S$GLB,,, | Performed by: FAMILY MEDICINE

## 2021-11-16 PROCEDURE — 99999 PR PBB SHADOW E&M-EST. PATIENT-LVL III: ICD-10-PCS | Mod: PBBFAC,,, | Performed by: FAMILY MEDICINE

## 2021-11-16 PROCEDURE — 90686 IIV4 VACC NO PRSV 0.5 ML IM: CPT | Mod: S$GLB,,, | Performed by: FAMILY MEDICINE

## 2021-11-16 PROCEDURE — 1159F PR MEDICATION LIST DOCUMENTED IN MEDICAL RECORD: ICD-10-PCS | Mod: CPTII,S$GLB,, | Performed by: FAMILY MEDICINE

## 2021-11-16 PROCEDURE — 3079F DIAST BP 80-89 MM HG: CPT | Mod: CPTII,S$GLB,, | Performed by: FAMILY MEDICINE

## 2021-11-16 PROCEDURE — 1159F MED LIST DOCD IN RCRD: CPT | Mod: CPTII,S$GLB,, | Performed by: FAMILY MEDICINE

## 2021-11-16 PROCEDURE — 3079F PR MOST RECENT DIASTOLIC BLOOD PRESSURE 80-89 MM HG: ICD-10-PCS | Mod: CPTII,S$GLB,, | Performed by: FAMILY MEDICINE

## 2021-11-16 PROCEDURE — 99396 PREV VISIT EST AGE 40-64: CPT | Mod: 25,S$GLB,, | Performed by: FAMILY MEDICINE

## 2021-11-16 PROCEDURE — 1160F RVW MEDS BY RX/DR IN RCRD: CPT | Mod: CPTII,S$GLB,, | Performed by: FAMILY MEDICINE

## 2021-11-16 PROCEDURE — 3075F SYST BP GE 130 - 139MM HG: CPT | Mod: CPTII,S$GLB,, | Performed by: FAMILY MEDICINE

## 2021-11-16 PROCEDURE — 80061 LIPID PANEL: CPT | Performed by: FAMILY MEDICINE

## 2021-11-16 PROCEDURE — 36415 COLL VENOUS BLD VENIPUNCTURE: CPT | Mod: PO | Performed by: FAMILY MEDICINE

## 2021-11-16 PROCEDURE — 3008F PR BODY MASS INDEX (BMI) DOCUMENTED: ICD-10-PCS | Mod: CPTII,S$GLB,, | Performed by: FAMILY MEDICINE

## 2021-11-16 PROCEDURE — 99396 PR PREVENTIVE VISIT,EST,40-64: ICD-10-PCS | Mod: 25,S$GLB,, | Performed by: FAMILY MEDICINE

## 2021-11-16 PROCEDURE — 80053 COMPREHEN METABOLIC PANEL: CPT | Performed by: FAMILY MEDICINE

## 2021-11-16 PROCEDURE — 90471 IMMUNIZATION ADMIN: CPT | Mod: S$GLB,,, | Performed by: FAMILY MEDICINE

## 2021-11-16 PROCEDURE — 90686 FLU VACCINE (QUAD) GREATER THAN OR EQUAL TO 3YO PRESERVATIVE FREE IM: ICD-10-PCS | Mod: S$GLB,,, | Performed by: FAMILY MEDICINE

## 2021-11-16 PROCEDURE — 3008F BODY MASS INDEX DOCD: CPT | Mod: CPTII,S$GLB,, | Performed by: FAMILY MEDICINE

## 2021-11-16 PROCEDURE — 3075F PR MOST RECENT SYSTOLIC BLOOD PRESS GE 130-139MM HG: ICD-10-PCS | Mod: CPTII,S$GLB,, | Performed by: FAMILY MEDICINE

## 2021-11-16 PROCEDURE — 1160F PR REVIEW ALL MEDS BY PRESCRIBER/CLIN PHARMACIST DOCUMENTED: ICD-10-PCS | Mod: CPTII,S$GLB,, | Performed by: FAMILY MEDICINE

## 2021-11-16 PROCEDURE — 84443 ASSAY THYROID STIM HORMONE: CPT | Performed by: FAMILY MEDICINE

## 2021-11-16 PROCEDURE — 99999 PR PBB SHADOW E&M-EST. PATIENT-LVL III: CPT | Mod: PBBFAC,,, | Performed by: FAMILY MEDICINE

## 2021-11-16 RX ORDER — LATANOPROST 50 UG/ML
1 SOLUTION/ DROPS OPHTHALMIC DAILY
COMMUNITY
Start: 2021-10-24

## 2022-03-11 NOTE — TELEPHONE ENCOUNTER
No new care gaps identified.  Powered by [employer]. Reference number: [id number].   3/11/2022 12:26:59 AM CST

## 2022-03-15 RX ORDER — MONTELUKAST SODIUM 10 MG/1
TABLET ORAL
Qty: 90 TABLET | Refills: 2 | Status: SHIPPED | OUTPATIENT
Start: 2022-03-15 | End: 2022-05-17

## 2022-05-17 ENCOUNTER — OFFICE VISIT (OUTPATIENT)
Dept: FAMILY MEDICINE | Facility: CLINIC | Age: 42
End: 2022-05-17
Payer: COMMERCIAL

## 2022-05-17 ENCOUNTER — LAB VISIT (OUTPATIENT)
Dept: LAB | Facility: HOSPITAL | Age: 42
End: 2022-05-17
Attending: FAMILY MEDICINE
Payer: COMMERCIAL

## 2022-05-17 VITALS
HEART RATE: 67 BPM | SYSTOLIC BLOOD PRESSURE: 124 MMHG | BODY MASS INDEX: 40.55 KG/M2 | WEIGHT: 243.38 LBS | HEIGHT: 65 IN | DIASTOLIC BLOOD PRESSURE: 82 MMHG | OXYGEN SATURATION: 99 %

## 2022-05-17 DIAGNOSIS — R73.09 ABNORMAL GLUCOSE: ICD-10-CM

## 2022-05-17 DIAGNOSIS — E66.01 CLASS 3 SEVERE OBESITY DUE TO EXCESS CALORIES WITHOUT SERIOUS COMORBIDITY WITH BODY MASS INDEX (BMI) OF 40.0 TO 44.9 IN ADULT: ICD-10-CM

## 2022-05-17 DIAGNOSIS — G89.29 CHRONIC PAIN OF RIGHT KNEE: Primary | ICD-10-CM

## 2022-05-17 DIAGNOSIS — J30.89 NON-SEASONAL ALLERGIC RHINITIS DUE TO OTHER ALLERGIC TRIGGER: ICD-10-CM

## 2022-05-17 DIAGNOSIS — E78.49 OTHER HYPERLIPIDEMIA: ICD-10-CM

## 2022-05-17 DIAGNOSIS — M25.561 CHRONIC PAIN OF RIGHT KNEE: Primary | ICD-10-CM

## 2022-05-17 PROBLEM — E66.812 CLASS 2 OBESITY DUE TO EXCESS CALORIES WITHOUT SERIOUS COMORBIDITY WITH BODY MASS INDEX (BMI) OF 39.0 TO 39.9 IN ADULT: Status: RESOLVED | Noted: 2018-11-13 | Resolved: 2022-05-17

## 2022-05-17 PROBLEM — E66.09 CLASS 2 OBESITY DUE TO EXCESS CALORIES WITHOUT SERIOUS COMORBIDITY WITH BODY MASS INDEX (BMI) OF 39.0 TO 39.9 IN ADULT: Status: RESOLVED | Noted: 2018-11-13 | Resolved: 2022-05-17

## 2022-05-17 LAB
ANION GAP SERPL CALC-SCNC: 13 MMOL/L (ref 8–16)
BUN SERPL-MCNC: 14 MG/DL (ref 6–20)
CALCIUM SERPL-MCNC: 9.6 MG/DL (ref 8.7–10.5)
CHLORIDE SERPL-SCNC: 105 MMOL/L (ref 95–110)
CO2 SERPL-SCNC: 19 MMOL/L (ref 23–29)
CREAT SERPL-MCNC: 1 MG/DL (ref 0.5–1.4)
EST. GFR  (AFRICAN AMERICAN): >60 ML/MIN/1.73 M^2
EST. GFR  (NON AFRICAN AMERICAN): >60 ML/MIN/1.73 M^2
ESTIMATED AVG GLUCOSE: 105 MG/DL (ref 68–131)
GLUCOSE SERPL-MCNC: 96 MG/DL (ref 70–110)
HBA1C MFR BLD: 5.3 % (ref 4–5.6)
POTASSIUM SERPL-SCNC: 3.9 MMOL/L (ref 3.5–5.1)
SODIUM SERPL-SCNC: 137 MMOL/L (ref 136–145)

## 2022-05-17 PROCEDURE — 3079F DIAST BP 80-89 MM HG: CPT | Mod: CPTII,S$GLB,, | Performed by: FAMILY MEDICINE

## 2022-05-17 PROCEDURE — 1159F MED LIST DOCD IN RCRD: CPT | Mod: CPTII,S$GLB,, | Performed by: FAMILY MEDICINE

## 2022-05-17 PROCEDURE — 99214 PR OFFICE/OUTPT VISIT, EST, LEVL IV, 30-39 MIN: ICD-10-PCS | Mod: S$GLB,,, | Performed by: FAMILY MEDICINE

## 2022-05-17 PROCEDURE — 3074F SYST BP LT 130 MM HG: CPT | Mod: CPTII,S$GLB,, | Performed by: FAMILY MEDICINE

## 2022-05-17 PROCEDURE — 99999 PR PBB SHADOW E&M-EST. PATIENT-LVL IV: CPT | Mod: PBBFAC,,, | Performed by: FAMILY MEDICINE

## 2022-05-17 PROCEDURE — 1159F PR MEDICATION LIST DOCUMENTED IN MEDICAL RECORD: ICD-10-PCS | Mod: CPTII,S$GLB,, | Performed by: FAMILY MEDICINE

## 2022-05-17 PROCEDURE — 36415 COLL VENOUS BLD VENIPUNCTURE: CPT | Mod: PO | Performed by: FAMILY MEDICINE

## 2022-05-17 PROCEDURE — 99999 PR PBB SHADOW E&M-EST. PATIENT-LVL IV: ICD-10-PCS | Mod: PBBFAC,,, | Performed by: FAMILY MEDICINE

## 2022-05-17 PROCEDURE — 1160F RVW MEDS BY RX/DR IN RCRD: CPT | Mod: CPTII,S$GLB,, | Performed by: FAMILY MEDICINE

## 2022-05-17 PROCEDURE — 3074F PR MOST RECENT SYSTOLIC BLOOD PRESSURE < 130 MM HG: ICD-10-PCS | Mod: CPTII,S$GLB,, | Performed by: FAMILY MEDICINE

## 2022-05-17 PROCEDURE — 99214 OFFICE O/P EST MOD 30 MIN: CPT | Mod: S$GLB,,, | Performed by: FAMILY MEDICINE

## 2022-05-17 PROCEDURE — 83036 HEMOGLOBIN GLYCOSYLATED A1C: CPT | Performed by: FAMILY MEDICINE

## 2022-05-17 PROCEDURE — 3079F PR MOST RECENT DIASTOLIC BLOOD PRESSURE 80-89 MM HG: ICD-10-PCS | Mod: CPTII,S$GLB,, | Performed by: FAMILY MEDICINE

## 2022-05-17 PROCEDURE — 1160F PR REVIEW ALL MEDS BY PRESCRIBER/CLIN PHARMACIST DOCUMENTED: ICD-10-PCS | Mod: CPTII,S$GLB,, | Performed by: FAMILY MEDICINE

## 2022-05-17 PROCEDURE — 80048 BASIC METABOLIC PNL TOTAL CA: CPT | Performed by: FAMILY MEDICINE

## 2022-05-17 PROCEDURE — 3008F BODY MASS INDEX DOCD: CPT | Mod: CPTII,S$GLB,, | Performed by: FAMILY MEDICINE

## 2022-05-17 PROCEDURE — 3008F PR BODY MASS INDEX (BMI) DOCUMENTED: ICD-10-PCS | Mod: CPTII,S$GLB,, | Performed by: FAMILY MEDICINE

## 2022-05-17 RX ORDER — CETIRIZINE HYDROCHLORIDE 10 MG/1
10 TABLET ORAL DAILY
COMMUNITY

## 2022-05-17 RX ORDER — LEVOCETIRIZINE DIHYDROCHLORIDE 5 MG/1
5 TABLET, FILM COATED ORAL NIGHTLY
COMMUNITY

## 2022-05-17 NOTE — PROGRESS NOTES
Assessment:       1. Chronic pain of right knee    2. Other hyperlipidemia    3. Class 3 severe obesity due to excess calories without serious comorbidity with body mass index (BMI) of 40.0 to 44.9 in adult    4. Non-seasonal allergic rhinitis due to other allergic trigger    5. Abnormal glucose        Plan:       Chronic pain of right knee: improving    Other hyperlipidemia:  improved    Class 3 severe obesity due to excess calories without serious comorbidity with body mass index (BMI) of 40.0 to 44.9 in adult: stable    Non-seasonal allergic rhinitis due to other allergic trigger: stable    Abnormal glucose: stable  -     Hemoglobin A1C; Future; Expected date: 05/17/2022  -     Basic Metabolic Panel; Future; Expected date: 05/17/2022       healthy habits, avoid processed foods processed starches, will repeat hemoglobin A1c and BMP levels, will call the patient after we have the results of the test.  The patient was advised to avoid fried foods, red meat and processed starches.  The patient declined any weight loss medicines at this time.  The patient's BMI has been recorded in the chart. The patient has been provided educational materials regarding the benefits of attaining and maintaining a normal weight. We will continue to address and follow this issue during follow up visits.   Patient agreed with assessment and plan. Patient verbalized understanding.     Subjective:       Patient ID: Fabienne Cummings is a 42 y.o. female.    Chief Complaint: Follow-up  Hyperlipidemia    HPI     Hyperlipidemia: the last cholesterol levels were improved from previous, the patient is currently not taking cholesterol medication, the patient has been trying to eat healthier.     Right knee pain:  The patient complains of right knee pain, the symptoms are improving, the patient just received a gel injection  and she is doing better.     Allergies:  The patient complains of allergies, the patient is taking multiple medications for  this, she is doing okay with current treatment.      Obesity: the patient stated that is difficult for her to lose weight because is not able to exercise as much secondary to the problem with her knee but is trying to make changes in her diet and eating healthier.    Past medical history, past social history was reviewed and discussed with the patient.    Review of Systems   Constitutional: Negative for activity change and appetite change.   HENT: Negative for congestion and ear discharge.    Eyes: Negative for discharge and itching.   Respiratory: Negative for choking and chest tightness.    Cardiovascular: Negative for chest pain and leg swelling.   Gastrointestinal: Negative for abdominal distention and abdominal pain.   Endocrine: Negative for cold intolerance and heat intolerance.   Genitourinary: Negative for dysuria and flank pain.   Musculoskeletal: Positive for arthralgias. Negative for back pain.   Skin: Negative for pallor and rash.   Allergic/Immunologic: Negative for environmental allergies and food allergies.   Neurological: Negative for dizziness, facial asymmetry and headaches.   Hematological: Negative for adenopathy. Does not bruise/bleed easily.   Psychiatric/Behavioral: Negative for agitation and confusion.       Objective:      Physical Exam  Vitals and nursing note reviewed.   Constitutional:       General: She is not in acute distress.     Appearance: Normal appearance. She is well-developed. She is obese. She is not diaphoretic.   HENT:      Head: Normocephalic and atraumatic.      Right Ear: External ear normal.      Left Ear: External ear normal.      Nose: Nose normal.      Mouth/Throat:      Pharynx: No oropharyngeal exudate.   Eyes:      General: No scleral icterus.        Right eye: No discharge.         Left eye: No discharge.      Conjunctiva/sclera: Conjunctivae normal.   Cardiovascular:      Rate and Rhythm: Normal rate and regular rhythm.      Heart sounds: Normal heart sounds. No  murmur heard.  Pulmonary:      Effort: Pulmonary effort is normal. No respiratory distress.      Breath sounds: Normal breath sounds. No wheezing.   Abdominal:      Palpations: There is no mass.   Musculoskeletal:         General: No tenderness or deformity.      Cervical back: Neck supple.   Skin:     Coloration: Skin is not pale.      Findings: No erythema.   Neurological:      Mental Status: She is alert.      Cranial Nerves: No cranial nerve deficit.      Coordination: Coordination normal.   Psychiatric:         Behavior: Behavior normal.         Thought Content: Thought content normal.         Judgment: Judgment normal.

## 2022-10-06 ENCOUNTER — HOSPITAL ENCOUNTER (OUTPATIENT)
Dept: RADIOLOGY | Facility: HOSPITAL | Age: 42
Discharge: HOME | End: 2022-10-06
Attending: FAMILY MEDICINE
Payer: COMMERCIAL

## 2022-10-06 DIAGNOSIS — Z12.31 ENCOUNTER FOR SCREENING MAMMOGRAM FOR MALIGNANT NEOPLASM OF BREAST: ICD-10-CM

## 2022-10-06 PROCEDURE — 77067 SCR MAMMO BI INCL CAD: CPT | Mod: 26,,, | Performed by: RADIOLOGY

## 2022-10-06 PROCEDURE — 77063 BREAST TOMOSYNTHESIS BI: CPT | Mod: TC,PO

## 2022-10-06 PROCEDURE — 77063 MAMMO DIGITAL SCREENING BILAT WITH TOMO: ICD-10-PCS | Mod: 26,,, | Performed by: RADIOLOGY

## 2022-10-06 PROCEDURE — 77067 MAMMO DIGITAL SCREENING BILAT WITH TOMO: ICD-10-PCS | Mod: 26,,, | Performed by: RADIOLOGY

## 2022-10-06 PROCEDURE — 77063 BREAST TOMOSYNTHESIS BI: CPT | Mod: 26,,, | Performed by: RADIOLOGY

## 2022-11-22 ENCOUNTER — LAB VISIT (OUTPATIENT)
Dept: LAB | Facility: HOSPITAL | Age: 42
End: 2022-11-22
Attending: FAMILY MEDICINE
Payer: COMMERCIAL

## 2022-11-22 ENCOUNTER — OFFICE VISIT (OUTPATIENT)
Dept: FAMILY MEDICINE | Facility: CLINIC | Age: 42
End: 2022-11-22
Payer: COMMERCIAL

## 2022-11-22 VITALS
SYSTOLIC BLOOD PRESSURE: 136 MMHG | DIASTOLIC BLOOD PRESSURE: 84 MMHG | WEIGHT: 245.13 LBS | HEIGHT: 65 IN | BODY MASS INDEX: 40.84 KG/M2 | HEART RATE: 66 BPM | OXYGEN SATURATION: 99 %

## 2022-11-22 DIAGNOSIS — Z00.01 ANNUAL VISIT FOR GENERAL ADULT MEDICAL EXAMINATION WITH ABNORMAL FINDINGS: ICD-10-CM

## 2022-11-22 DIAGNOSIS — E66.01 CLASS 3 SEVERE OBESITY DUE TO EXCESS CALORIES WITHOUT SERIOUS COMORBIDITY WITH BODY MASS INDEX (BMI) OF 40.0 TO 44.9 IN ADULT: ICD-10-CM

## 2022-11-22 DIAGNOSIS — M25.561 CHRONIC PAIN OF RIGHT KNEE: ICD-10-CM

## 2022-11-22 DIAGNOSIS — G89.29 CHRONIC PAIN OF RIGHT KNEE: ICD-10-CM

## 2022-11-22 DIAGNOSIS — Z00.01 ANNUAL VISIT FOR GENERAL ADULT MEDICAL EXAMINATION WITH ABNORMAL FINDINGS: Primary | ICD-10-CM

## 2022-11-22 PROBLEM — E66.813 CLASS 3 SEVERE OBESITY DUE TO EXCESS CALORIES WITHOUT SERIOUS COMORBIDITY WITH BODY MASS INDEX (BMI) OF 40.0 TO 44.9 IN ADULT: Status: ACTIVE | Noted: 2022-11-22

## 2022-11-22 LAB
25(OH)D3+25(OH)D2 SERPL-MCNC: 45 NG/ML (ref 30–96)
ALBUMIN SERPL BCP-MCNC: 4.1 G/DL (ref 3.5–5.2)
ALP SERPL-CCNC: 114 U/L (ref 55–135)
ALT SERPL W/O P-5'-P-CCNC: 20 U/L (ref 10–44)
ANION GAP SERPL CALC-SCNC: 10 MMOL/L (ref 8–16)
AST SERPL-CCNC: 21 U/L (ref 10–40)
BASOPHILS # BLD AUTO: 0.06 K/UL (ref 0–0.2)
BASOPHILS NFR BLD: 0.7 % (ref 0–1.9)
BILIRUB SERPL-MCNC: 0.7 MG/DL (ref 0.1–1)
BUN SERPL-MCNC: 12 MG/DL (ref 6–20)
CALCIUM SERPL-MCNC: 9.8 MG/DL (ref 8.7–10.5)
CHLORIDE SERPL-SCNC: 102 MMOL/L (ref 95–110)
CHOLEST SERPL-MCNC: 250 MG/DL (ref 120–199)
CHOLEST/HDLC SERPL: 4.5 {RATIO} (ref 2–5)
CO2 SERPL-SCNC: 25 MMOL/L (ref 23–29)
CREAT SERPL-MCNC: 1 MG/DL (ref 0.5–1.4)
DIFFERENTIAL METHOD: ABNORMAL
EOSINOPHIL # BLD AUTO: 0.2 K/UL (ref 0–0.5)
EOSINOPHIL NFR BLD: 2.3 % (ref 0–8)
ERYTHROCYTE [DISTWIDTH] IN BLOOD BY AUTOMATED COUNT: 13.3 % (ref 11.5–14.5)
EST. GFR  (NO RACE VARIABLE): >60 ML/MIN/1.73 M^2
GLUCOSE SERPL-MCNC: 105 MG/DL (ref 70–110)
HCT VFR BLD AUTO: 44.4 % (ref 37–48.5)
HDLC SERPL-MCNC: 55 MG/DL (ref 40–75)
HDLC SERPL: 22 % (ref 20–50)
HGB BLD-MCNC: 14.5 G/DL (ref 12–16)
IMM GRANULOCYTES # BLD AUTO: 0.02 K/UL (ref 0–0.04)
IMM GRANULOCYTES NFR BLD AUTO: 0.2 % (ref 0–0.5)
INSULIN COLLECTION INTERVAL: 1
INSULIN SERPL-ACNC: 10.4 UU/ML
LDLC SERPL CALC-MCNC: 171.4 MG/DL (ref 63–159)
LYMPHOCYTES # BLD AUTO: 2.6 K/UL (ref 1–4.8)
LYMPHOCYTES NFR BLD: 30.8 % (ref 18–48)
MCH RBC QN AUTO: 31.1 PG (ref 27–31)
MCHC RBC AUTO-ENTMCNC: 32.7 G/DL (ref 32–36)
MCV RBC AUTO: 95 FL (ref 82–98)
MONOCYTES # BLD AUTO: 0.5 K/UL (ref 0.3–1)
MONOCYTES NFR BLD: 5.4 % (ref 4–15)
NEUTROPHILS # BLD AUTO: 5.1 K/UL (ref 1.8–7.7)
NEUTROPHILS NFR BLD: 60.6 % (ref 38–73)
NONHDLC SERPL-MCNC: 195 MG/DL
NRBC BLD-RTO: 0 /100 WBC
PLATELET # BLD AUTO: 254 K/UL (ref 150–450)
PMV BLD AUTO: 10.8 FL (ref 9.2–12.9)
POTASSIUM SERPL-SCNC: 3.4 MMOL/L (ref 3.5–5.1)
PROT SERPL-MCNC: 7.9 G/DL (ref 6–8.4)
RBC # BLD AUTO: 4.66 M/UL (ref 4–5.4)
SODIUM SERPL-SCNC: 137 MMOL/L (ref 136–145)
TRIGL SERPL-MCNC: 118 MG/DL (ref 30–150)
TSH SERPL DL<=0.005 MIU/L-ACNC: 2.62 UIU/ML (ref 0.4–4)
WBC # BLD AUTO: 8.39 K/UL (ref 3.9–12.7)

## 2022-11-22 PROCEDURE — 90686 IIV4 VACC NO PRSV 0.5 ML IM: CPT | Mod: S$GLB,,, | Performed by: FAMILY MEDICINE

## 2022-11-22 PROCEDURE — 99999 PR PBB SHADOW E&M-EST. PATIENT-LVL IV: ICD-10-PCS | Mod: PBBFAC,,, | Performed by: FAMILY MEDICINE

## 2022-11-22 PROCEDURE — 1159F MED LIST DOCD IN RCRD: CPT | Mod: CPTII,S$GLB,, | Performed by: FAMILY MEDICINE

## 2022-11-22 PROCEDURE — 84443 ASSAY THYROID STIM HORMONE: CPT | Performed by: FAMILY MEDICINE

## 2022-11-22 PROCEDURE — 3075F PR MOST RECENT SYSTOLIC BLOOD PRESS GE 130-139MM HG: ICD-10-PCS | Mod: CPTII,S$GLB,, | Performed by: FAMILY MEDICINE

## 2022-11-22 PROCEDURE — 90471 IMMUNIZATION ADMIN: CPT | Mod: S$GLB,,, | Performed by: FAMILY MEDICINE

## 2022-11-22 PROCEDURE — 99999 PR PBB SHADOW E&M-EST. PATIENT-LVL IV: CPT | Mod: PBBFAC,,, | Performed by: FAMILY MEDICINE

## 2022-11-22 PROCEDURE — 80053 COMPREHEN METABOLIC PANEL: CPT | Performed by: FAMILY MEDICINE

## 2022-11-22 PROCEDURE — 3079F DIAST BP 80-89 MM HG: CPT | Mod: CPTII,S$GLB,, | Performed by: FAMILY MEDICINE

## 2022-11-22 PROCEDURE — 90686 FLU VACCINE (QUAD) GREATER THAN OR EQUAL TO 3YO PRESERVATIVE FREE IM: ICD-10-PCS | Mod: S$GLB,,, | Performed by: FAMILY MEDICINE

## 2022-11-22 PROCEDURE — 3079F PR MOST RECENT DIASTOLIC BLOOD PRESSURE 80-89 MM HG: ICD-10-PCS | Mod: CPTII,S$GLB,, | Performed by: FAMILY MEDICINE

## 2022-11-22 PROCEDURE — 82306 VITAMIN D 25 HYDROXY: CPT | Performed by: FAMILY MEDICINE

## 2022-11-22 PROCEDURE — 3044F PR MOST RECENT HEMOGLOBIN A1C LEVEL <7.0%: ICD-10-PCS | Mod: CPTII,S$GLB,, | Performed by: FAMILY MEDICINE

## 2022-11-22 PROCEDURE — 3044F HG A1C LEVEL LT 7.0%: CPT | Mod: CPTII,S$GLB,, | Performed by: FAMILY MEDICINE

## 2022-11-22 PROCEDURE — 3008F BODY MASS INDEX DOCD: CPT | Mod: CPTII,S$GLB,, | Performed by: FAMILY MEDICINE

## 2022-11-22 PROCEDURE — 99396 PR PREVENTIVE VISIT,EST,40-64: ICD-10-PCS | Mod: 25,S$GLB,, | Performed by: FAMILY MEDICINE

## 2022-11-22 PROCEDURE — 1159F PR MEDICATION LIST DOCUMENTED IN MEDICAL RECORD: ICD-10-PCS | Mod: CPTII,S$GLB,, | Performed by: FAMILY MEDICINE

## 2022-11-22 PROCEDURE — 3075F SYST BP GE 130 - 139MM HG: CPT | Mod: CPTII,S$GLB,, | Performed by: FAMILY MEDICINE

## 2022-11-22 PROCEDURE — 90471 FLU VACCINE (QUAD) GREATER THAN OR EQUAL TO 3YO PRESERVATIVE FREE IM: ICD-10-PCS | Mod: S$GLB,,, | Performed by: FAMILY MEDICINE

## 2022-11-22 PROCEDURE — 85025 COMPLETE CBC W/AUTO DIFF WBC: CPT | Performed by: FAMILY MEDICINE

## 2022-11-22 PROCEDURE — 99396 PREV VISIT EST AGE 40-64: CPT | Mod: 25,S$GLB,, | Performed by: FAMILY MEDICINE

## 2022-11-22 PROCEDURE — 80061 LIPID PANEL: CPT | Performed by: FAMILY MEDICINE

## 2022-11-22 PROCEDURE — 83525 ASSAY OF INSULIN: CPT | Performed by: FAMILY MEDICINE

## 2022-11-22 PROCEDURE — 3008F PR BODY MASS INDEX (BMI) DOCUMENTED: ICD-10-PCS | Mod: CPTII,S$GLB,, | Performed by: FAMILY MEDICINE

## 2022-11-22 NOTE — PROGRESS NOTES
Assessment:       1. Annual visit for general adult medical examination with abnormal findings    2. Class 3 severe obesity due to excess calories without serious comorbidity with body mass index (BMI) of 40.0 to 44.9 in adult    3. Chronic pain of right knee          Plan:       Annual visit for general adult medical examination with abnormal findings  -     CBC Auto Differential; Future; Expected date: 11/22/2022  -     Comprehensive Metabolic Panel; Future; Expected date: 11/22/2022  -     Lipid Panel; Future; Expected date: 11/22/2022  -     TSH; Future; Expected date: 11/22/2022  -     Vitamin D; Future; Expected date: 11/22/2022  -     Insulin, Random; Future; Expected date: 11/22/2022    Class 3 severe obesity due to excess calories without serious comorbidity with body mass index (BMI) of 40.0 to 44.9 in adult:  Stable  -     Vitamin D; Future; Expected date: 11/22/2022  -     Insulin, Random; Future; Expected date: 11/22/2022    Chronic pain of right knee:  Stable       Recommend healthy eating habits, avoid processed foods, processed starches, eat more vegetables, small portions, drink more water.  Will call the patient after we have the results of the test.  The patient's BMI has been recorded in the chart. The patient has been provided educational materials regarding the benefits of attaining and maintaining a normal weight. We will continue to address and follow this issue during follow up visits.   Patient agreed with assessment and plan. Patient verbalized understanding.     Subjective:       Patient ID: Fabienne Cummings is a 42 y.o. female.    Chief Complaint: Annual Exam    HPI    The patient is coming here today for an annual examination, the patient has problems with the right knee, she has  meniscal tears on the knee, currently seen the specialist and receiving injections, she is trying to exercise more, she has been gaining is slightly weight because was not able to exercise as much but is  trying to eat healthier, also she is taking prebiotics and probiotics over-the-counter, magnesium supplements supplements.  The patient stated that she has been notice that her hair is not growing as fast as before and wants to make sure that her levels for hormones are okay, the patient stated that her menstrual periods are regular and he does not have any hot flashes.  She is up-to-date with Pap smears and also she had her tetanus vaccine, the patient declined COVID vaccine.    Past medical history, past social history was reviewed and discussed with the patient.    Review of Systems   Constitutional:  Negative for activity change, appetite change and chills.   HENT:  Negative for congestion and ear discharge.    Eyes:  Negative for discharge and itching.   Respiratory:  Negative for choking and chest tightness.    Cardiovascular:  Negative for chest pain, palpitations and leg swelling.   Gastrointestinal:  Negative for abdominal distention, abdominal pain and constipation.   Endocrine: Negative for cold intolerance and heat intolerance.   Genitourinary:  Negative for dysuria and flank pain.   Musculoskeletal:  Positive for arthralgias. Negative for back pain.   Skin:  Negative for pallor and rash.   Allergic/Immunologic: Negative for environmental allergies and food allergies.   Neurological:  Negative for dizziness, facial asymmetry and headaches.   Hematological:  Negative for adenopathy. Does not bruise/bleed easily.   Psychiatric/Behavioral:  Negative for agitation, confusion, decreased concentration and sleep disturbance.      Objective:      Physical Exam  Vitals and nursing note reviewed.   Constitutional:       General: She is not in acute distress.     Appearance: Normal appearance. She is well-developed. She is obese. She is not diaphoretic.   HENT:      Head: Normocephalic and atraumatic.      Right Ear: External ear normal.      Left Ear: External ear normal.      Nose: Nose normal.      Mouth/Throat:       Pharynx: No oropharyngeal exudate.   Eyes:      General: No scleral icterus.        Right eye: No discharge.         Left eye: No discharge.      Conjunctiva/sclera: Conjunctivae normal.      Pupils: Pupils are equal, round, and reactive to light.   Cardiovascular:      Rate and Rhythm: Normal rate and regular rhythm.      Heart sounds: Normal heart sounds.   Pulmonary:      Effort: Pulmonary effort is normal. No respiratory distress.      Breath sounds: Normal breath sounds. No wheezing.   Abdominal:      General: Abdomen is flat. Bowel sounds are normal. There is no distension.      Palpations: Abdomen is soft. There is no mass.      Tenderness: There is no abdominal tenderness. There is no guarding.   Musculoskeletal:         General: No deformity.      Cervical back: Neck supple.   Skin:     Coloration: Skin is not pale.      Findings: No erythema.   Neurological:      Mental Status: She is alert.      Cranial Nerves: No cranial nerve deficit.      Coordination: Coordination normal.   Psychiatric:         Behavior: Behavior normal.         Thought Content: Thought content normal.         Judgment: Judgment normal.

## 2022-11-27 DIAGNOSIS — E87.6 HYPOKALEMIA: Primary | ICD-10-CM

## 2022-11-27 RX ORDER — POTASSIUM CHLORIDE 750 MG/1
10 CAPSULE, EXTENDED RELEASE ORAL DAILY
Qty: 30 CAPSULE | Refills: 0 | Status: SHIPPED | OUTPATIENT
Start: 2022-11-27 | End: 2022-12-22

## 2022-12-22 ENCOUNTER — LAB VISIT (OUTPATIENT)
Dept: LAB | Facility: HOSPITAL | Age: 42
End: 2022-12-22
Attending: FAMILY MEDICINE
Payer: COMMERCIAL

## 2022-12-22 DIAGNOSIS — E87.6 HYPOKALEMIA: ICD-10-CM

## 2022-12-22 LAB
ANION GAP SERPL CALC-SCNC: 13 MMOL/L (ref 8–16)
BUN SERPL-MCNC: 12 MG/DL (ref 6–20)
CALCIUM SERPL-MCNC: 9.6 MG/DL (ref 8.7–10.5)
CHLORIDE SERPL-SCNC: 106 MMOL/L (ref 95–110)
CO2 SERPL-SCNC: 23 MMOL/L (ref 23–29)
CREAT SERPL-MCNC: 1.1 MG/DL (ref 0.5–1.4)
EST. GFR  (NO RACE VARIABLE): >60 ML/MIN/1.73 M^2
GLUCOSE SERPL-MCNC: 106 MG/DL (ref 70–110)
POTASSIUM SERPL-SCNC: 4.3 MMOL/L (ref 3.5–5.1)
SODIUM SERPL-SCNC: 142 MMOL/L (ref 136–145)

## 2022-12-22 PROCEDURE — 80048 BASIC METABOLIC PNL TOTAL CA: CPT | Performed by: FAMILY MEDICINE

## 2022-12-22 PROCEDURE — 36415 COLL VENOUS BLD VENIPUNCTURE: CPT | Mod: PO | Performed by: FAMILY MEDICINE

## 2023-01-22 DIAGNOSIS — E87.6 HYPOKALEMIA: ICD-10-CM

## 2023-01-23 RX ORDER — POTASSIUM CHLORIDE 750 MG/1
10 CAPSULE, EXTENDED RELEASE ORAL DAILY
Qty: 90 CAPSULE | Refills: 0 | Status: SHIPPED | OUTPATIENT
Start: 2023-01-23 | End: 2023-04-17

## 2023-01-23 NOTE — TELEPHONE ENCOUNTER
Refill Routing Note   Medication(s) are not appropriate for processing by [facility] for the following reason(s):      - Medication is a new start (<3 months)    [facility] action(s):  Defer          Medication reconciliation completed: No     Appointments  past 12m or future 3m with PCP    Date Provider   Last Visit   11/22/2022 Ladi Noyola MD   Next Visit   Visit date not found Ladi Noyola MD   ED visits in past 90 days: 0        Note composed:6:33 AM 01/23/2023

## 2023-05-02 ENCOUNTER — TELEPHONE (OUTPATIENT)
Dept: FAMILY MEDICINE | Facility: CLINIC | Age: 43
End: 2023-05-02
Payer: COMMERCIAL

## 2023-05-02 NOTE — TELEPHONE ENCOUNTER
----- Message from Maria Luz Pompa sent at 5/2/2023  2:52 PM CDT -----  Contact: Farhan [phone]  Type: Needs Medical Advice  Who Called:  Farhan ortiz/ Dr Daniel Caceres office     Best Call Back Number: [phone] fax [phone]    Additional Information: Farhan is calling to get a status update on pts clearance. She is having surgery on Friday. Pls call back and advise

## 2023-05-03 ENCOUNTER — TELEPHONE (OUTPATIENT)
Dept: FAMILY MEDICINE | Facility: CLINIC | Age: 43
End: 2023-05-03
Payer: COMMERCIAL

## 2023-05-03 ENCOUNTER — HOSPITAL ENCOUNTER (OUTPATIENT)
Dept: RADIOLOGY | Facility: HOSPITAL | Age: 43
Discharge: HOME | End: 2023-05-03
Attending: PHYSICIAN ASSISTANT
Payer: COMMERCIAL

## 2023-05-03 ENCOUNTER — OFFICE VISIT (OUTPATIENT)
Dept: FAMILY MEDICINE | Facility: CLINIC | Age: 43
End: 2023-05-03
Payer: COMMERCIAL

## 2023-05-03 VITALS
WEIGHT: 255.75 LBS | HEIGHT: 65 IN | HEART RATE: 62 BPM | OXYGEN SATURATION: 99 % | BODY MASS INDEX: 42.61 KG/M2 | DIASTOLIC BLOOD PRESSURE: 86 MMHG | SYSTOLIC BLOOD PRESSURE: 126 MMHG

## 2023-05-03 DIAGNOSIS — Z01.818 PREOPERATIVE CLEARANCE: ICD-10-CM

## 2023-05-03 DIAGNOSIS — Z01.818 PREOPERATIVE CLEARANCE: Primary | ICD-10-CM

## 2023-05-03 PROCEDURE — 3079F DIAST BP 80-89 MM HG: CPT | Mod: CPTII,S$GLB,, | Performed by: PHYSICIAN ASSISTANT

## 2023-05-03 PROCEDURE — 93010 EKG 12-LEAD: ICD-10-PCS | Mod: S$GLB,,, | Performed by: INTERNAL MEDICINE

## 2023-05-03 PROCEDURE — 3079F PR MOST RECENT DIASTOLIC BLOOD PRESSURE 80-89 MM HG: ICD-10-PCS | Mod: CPTII,S$GLB,, | Performed by: PHYSICIAN ASSISTANT

## 2023-05-03 PROCEDURE — 71046 X-RAY EXAM CHEST 2 VIEWS: CPT | Mod: 26,,, | Performed by: RADIOLOGY

## 2023-05-03 PROCEDURE — 99214 PR OFFICE/OUTPT VISIT, EST, LEVL IV, 30-39 MIN: ICD-10-PCS | Mod: S$GLB,,, | Performed by: PHYSICIAN ASSISTANT

## 2023-05-03 PROCEDURE — 1159F PR MEDICATION LIST DOCUMENTED IN MEDICAL RECORD: ICD-10-PCS | Mod: CPTII,S$GLB,, | Performed by: PHYSICIAN ASSISTANT

## 2023-05-03 PROCEDURE — 93005 ELECTROCARDIOGRAM TRACING: CPT | Mod: S$GLB,,, | Performed by: PHYSICIAN ASSISTANT

## 2023-05-03 PROCEDURE — 3008F BODY MASS INDEX DOCD: CPT | Mod: CPTII,S$GLB,, | Performed by: PHYSICIAN ASSISTANT

## 2023-05-03 PROCEDURE — 3008F PR BODY MASS INDEX (BMI) DOCUMENTED: ICD-10-PCS | Mod: CPTII,S$GLB,, | Performed by: PHYSICIAN ASSISTANT

## 2023-05-03 PROCEDURE — 71046 XR CHEST PA AND LATERAL: ICD-10-PCS | Mod: 26,,, | Performed by: RADIOLOGY

## 2023-05-03 PROCEDURE — 93010 ELECTROCARDIOGRAM REPORT: CPT | Mod: S$GLB,,, | Performed by: INTERNAL MEDICINE

## 2023-05-03 PROCEDURE — 3074F SYST BP LT 130 MM HG: CPT | Mod: CPTII,S$GLB,, | Performed by: PHYSICIAN ASSISTANT

## 2023-05-03 PROCEDURE — 71046 X-RAY EXAM CHEST 2 VIEWS: CPT | Mod: TC,FY,PO

## 2023-05-03 PROCEDURE — 1159F MED LIST DOCD IN RCRD: CPT | Mod: CPTII,S$GLB,, | Performed by: PHYSICIAN ASSISTANT

## 2023-05-03 PROCEDURE — 3074F PR MOST RECENT SYSTOLIC BLOOD PRESSURE < 130 MM HG: ICD-10-PCS | Mod: CPTII,S$GLB,, | Performed by: PHYSICIAN ASSISTANT

## 2023-05-03 PROCEDURE — 99214 OFFICE O/P EST MOD 30 MIN: CPT | Mod: S$GLB,,, | Performed by: PHYSICIAN ASSISTANT

## 2023-05-03 PROCEDURE — 99999 PR PBB SHADOW E&M-EST. PATIENT-LVL IV: ICD-10-PCS | Mod: PBBFAC,,, | Performed by: PHYSICIAN ASSISTANT

## 2023-05-03 PROCEDURE — 99999 PR PBB SHADOW E&M-EST. PATIENT-LVL IV: CPT | Mod: PBBFAC,,, | Performed by: PHYSICIAN ASSISTANT

## 2023-05-03 PROCEDURE — 93005 EKG 12-LEAD: ICD-10-PCS | Mod: S$GLB,,, | Performed by: PHYSICIAN ASSISTANT

## 2023-05-03 RX ORDER — TIMOLOL MALEATE 5 MG/ML
SOLUTION/ DROPS OPHTHALMIC
COMMUNITY
Start: 2023-03-07

## 2023-05-03 NOTE — TELEPHONE ENCOUNTER
Patient is scheduled to see CHERYL Ferreira for pre-op. Attempted to reach Dr. Caceres office several times. Pre-Op notes will be faxed to [phone]

## 2023-05-03 NOTE — TELEPHONE ENCOUNTER
----- Message from Cindy Short sent at 5/3/2023  8:34 AM CDT -----  Who Called:     What is the request in detail: Requesting call back to discuss pt clearance for surgery on 05/05. Pls advise    Can the clinic reply by [facility]? No    Best Call Back Number: [phone]    Additional Information: fax:[phone]

## 2023-05-03 NOTE — PROGRESS NOTES
Patient ID: Fabienne Cummings is a 43 y.o. female.    Chief Complaint: Procedure (Clearance )    Patient is new to me.     Fabienne Cummings is in the office for preoperative clearance for right knee arthroscopy 5/5/2023 with Dr. Caceres.    HPI  Patient reports right knee pain for 18 months.  Patient Active Problem List   Diagnosis    Non-seasonal allergic rhinitis due to pollen    Pure hyperglyceridemia    BMI 39.0-39.9,adult    Non-seasonal allergic rhinitis    Class 3 severe obesity due to excess calories without serious comorbidity with body mass index (BMI) of 40.0 to 44.9 in adult       Past Medical History:   Diagnosis Date    Abnormal Pap smear of cervix     Allergy     Class 2 obesity due to excess calories without serious comorbidity with body mass index (BMI) of 39.0 to 39.9 in adult 11/13/2018     STOP BANG Questionnaire  Patient diagnosed with Obstructive Sleep Apnea?: No  Has loud snoring: No  Disturbed sleep, daytime fatigue, daytime somnolence: No  Observed to have interrupted breathing during sleep: No  Takes medication for high blood pressure: No  Not taking BP medication but supposed to be: No  Recent BMI (Calculated): 42.6  Is BMI greater than 35 kg/m2?: 1=Yes  Age older than 50 years old?: 0=No  Has large neck size >40cm (15.7in., large male shirt size, large male collar size >16): Yes  Gender - Male: 0=No  STOP-Bang Total Score: 2      Current Outpatient Medications:     azelastine (ASTELIN) 137 mcg (0.1 %) nasal spray, 1 spray by Nasal route 2 (two) times daily., Disp: , Rfl:     azelastine (OPTIVAR) 0.05 % ophthalmic solution, 1 drop 2 (two) times daily., Disp: , Rfl:     cetirizine (ZYRTEC) 10 MG tablet, Take 10 mg by mouth once daily., Disp: , Rfl:     latanoprost 0.005 % ophthalmic solution, Place 1 drop into both eyes once daily., Disp: , Rfl:     levocetirizine (XYZAL) 5 MG tablet, Take 5 mg by mouth every evening., Disp: , Rfl:     potassium chloride (MICRO-K) 10 MEQ CpSR, TAKE 1  CAPSULE (10 MEQ TOTAL) BY MOUTH ONCE DAILY. FOR 90 DOSES, Disp: 90 capsule, Rfl: 2    timolol maleate 0.5% (TIMOPTIC) 0.5 % Drop, INSTILL 1 DROP BOTH EYES EVERY MORNING, Disp: , Rfl:     The 10-year ASCVD risk score (Korey MATIAS, et al., 2019) is: 1.1%    Values used to calculate the score:      Age: 43 years      Sex: Female      Is Non- : Yes      Diabetic: No      Tobacco smoker: No      Systolic Blood Pressure: 126 mmHg      Is BP treated: No      HDL Cholesterol: 55 mg/dL      Total Cholesterol: 250 mg/dL     Wt Readings from Last 3 Encounters:   05/03/23 116 kg (255 lb 11.7 oz)   11/22/22 111.2 kg (245 lb 2.4 oz)   05/17/22 110.4 kg (243 lb 6.2 oz)     Temp Readings from Last 3 Encounters:   11/16/20 98.5 °F (36.9 °C) (Oral)     BP Readings from Last 3 Encounters:   05/03/23 126/86   11/22/22 136/84   05/17/22 124/82     Pulse Readings from Last 3 Encounters:   05/03/23 62   11/22/22 66   05/17/22 67     Resp Readings from Last 3 Encounters:   11/16/20 18     PF Readings from Last 3 Encounters:   No data found for PF     SpO2 Readings from Last 3 Encounters:   05/03/23 99%   11/22/22 99%   05/17/22 99%        Lab Results   Component Value Date    HGBA1C 5.3 05/17/2022    HGBA1C 5.4 11/15/2019     Lab Results   Component Value Date    LDLCALC 171.4 (H) 11/22/2022    CREATININE 1.0 05/03/2023     Review of Systems   Constitutional:  Negative for appetite change, chills and fever.   HENT:  Negative for ear pain and sore throat.    Eyes:  Negative for pain.   Respiratory:  Negative for cough and shortness of breath.    Cardiovascular:  Negative for chest pain.   Gastrointestinal:  Negative for abdominal pain, blood in stool, constipation, diarrhea, nausea and vomiting.   Genitourinary:  Negative for dysuria, frequency and hematuria.   Musculoskeletal:  Positive for arthralgias (right knee pain).   Skin:  Negative for rash.   Neurological:  Negative for dizziness, light-headedness and  headaches.   Psychiatric/Behavioral:  Negative for sleep disturbance.        Objective:      Physical Exam    Vitals reviewed.   Constitutional:       General: She is not in acute distress.     Appearance: Normal appearance. She is well-developed and well-groomed.   HENT:      Head: Normocephalic and atraumatic.      Right Ear: Hearing, tympanic membrane, ear canal and external ear normal.      Left Ear: Hearing, tympanic membrane, ear canal and external ear normal.      Nose: Nose normal.      Right Sinus: No maxillary sinus tenderness or frontal sinus tenderness.      Left Sinus: No maxillary sinus tenderness or frontal sinus tenderness.      Mouth/Throat:      Lips: Pink.      Mouth: Mucous membranes are moist.      Pharynx: Oropharynx is clear.   Eyes:      General: Lids are normal.      Conjunctiva/sclera: Conjunctivae normal.   Neck:      Trachea: Phonation normal.   Cardiovascular:      Rate and Rhythm: Normal rate and regular rhythm.      Heart sounds: Normal heart sounds. No murmur heard.   No friction rub. No gallop.    Pulmonary:      Effort: Pulmonary effort is normal. No respiratory distress.      Breath sounds: Normal breath sounds. No decreased breath sounds, wheezing, rhonchi or rales.   Abdominal:      General: Bowel sounds are normal.      Palpations: Abdomen is soft.      Tenderness: There is no abdominal tenderness.   Musculoskeletal:         General: Normal range of motion.      Cervical back: Normal range of motion.   Lymphadenopathy:      Head:      Right side of head: No submental, submandibular, tonsillar, preauricular, posterior auricular or occipital adenopathy.      Left side of head: No submental, submandibular, tonsillar, preauricular, posterior auricular or occipital adenopathy.      Cervical: No cervical adenopathy.   Skin:     General: Skin is warm and dry.      Findings: No rash.   Neurological:      General: No focal deficit present.      Mental Status: She is alert and oriented to  person, place, and time.   Psychiatric:         Attention and Perception: Attention normal.         Mood and Affect: Mood normal.         Speech: Speech normal.         Behavior: Behavior normal. Behavior is cooperative.          Cognition and Memory: Cognition normal.         Judgment: Judgment normal.      Screening recommendations appropriate to age and health status were reviewed.    Preoperative clearance  -     IN OFFICE EKG 12-LEAD (to Muse)  -     X-Ray Chest PA And Lateral; Future; Expected date: 05/03/2023  -     CBC Auto Differential; Future; Expected date: 05/03/2023  -     Comprehensive Metabolic Panel; Future; Expected date: 05/03/2023  -     Protime-INR; Future; Expected date: 05/03/2023  -     APTT; Future; Expected date: 05/03/2023      RCRI risk factors include: no known RCRI risk factors. As such, per RCRI the risk of cardiac death, nonfatal myocardial infarction, or nonfatal cardiac arrest is 0.4% and the risk of myocardial infarction, pulmonary edema, ventricular fibrillation, primary cardiac arrest, or complete heart block is 0.5%.  Overall this patient can be considered low risk for this intermediate risk procedure. No further cardiac testing is recommended at this time.     Patient denies any symptoms (as per HPI) concerning for undiagnosed lung disease including ISMAEL. Would not recommend obtaining sleep study or PFTs at this time. Patient is a non-smoker. We discussed the benefits of early mobilization and deep breathing after surgery.      Screened patient for alcohol misuse, use of illicit drugs, and personal or family history of anesthetic complications or bleeding diathesis and no substantial concerns were identified.  No anesthesia complications with past surgery.    All current medications were reviewed and at this time no changes to medications are recommended prior to surgery.    I recommend use of standard pre-op and post-op precautions for this patient. In my opinion, she is  medically optimized for this procedure and can proceed without further evaluation.

## 2023-08-14 ENCOUNTER — PATIENT MESSAGE (OUTPATIENT)
Dept: ADMINISTRATIVE | Facility: HOSPITAL | Age: 43
End: 2023-08-14
Payer: COMMERCIAL

## 2023-08-17 ENCOUNTER — PATIENT MESSAGE (OUTPATIENT)
Dept: ADMINISTRATIVE | Facility: HOSPITAL | Age: 43
End: 2023-08-17
Payer: COMMERCIAL

## 2023-08-17 ENCOUNTER — PATIENT OUTREACH (OUTPATIENT)
Dept: ADMINISTRATIVE | Facility: HOSPITAL | Age: 43
End: 2023-08-17
Payer: COMMERCIAL

## 2023-08-17 DIAGNOSIS — Z12.31 ENCOUNTER FOR SCREENING MAMMOGRAM FOR MALIGNANT NEOPLASM OF BREAST: Primary | ICD-10-CM

## 2023-08-17 NOTE — PROGRESS NOTES
Population Health Chart Review & Patient Outreach Details:     Reason for Outreach Encounter:     [x]  Non-Compliant Report   []  Payor Report ([employer], PHN, BCBS, MSSP, MCIP, UHC, etc.)   []  Pre-Visit Chart Review     Updates Requested / Reviewed:     []  Care Everywhere    []     []  External Sources ([facility], [facility], DIS, etc.)   []  Care Team Updated    Patient Outreach Method:    []  Telephone Outreach Completed   [] Successful   [] Left Voicemail   [] Unable to Contact (wrong number, no voicemail)  [x]  [facility] Outreach Sent  []  Letter Outreach Mailed  []  Fax Sent for External Records  []  External Records Upload    Health Maintenance Topics Addressed and Outreach Outcomes / Actions Taken:        [x]      Breast Cancer Screening []  Mammo Scheduled      []  External Records Requested     []  Added Reminder to Complete to Upcoming Primary Care Appt Notes     []  Patient Declined     []  Patient Will Call Back to Schedule     []  Patient Will Schedule with External Provider / Order Routed if Applicable             []       Cervical Cancer Screening []  Pap Scheduled      []  External Records Requested     []  Added Reminder to Complete to Upcoming Primary Care Appt Notes     []  Patient Declined     []  Patient Will Call Back to Schedule     []  Patient Will Schedule with External Provider               []          Colorectal Cancer Screening []  Colonoscopy Case Request or Referral Placed     []  External Records Requested     []  Added Reminder to Complete to Upcoming Primary Care Appt Notes     []  Patient Declined     []  Patient Will Call Back to Schedule     []  Patient Will Schedule with External Provider     []  Fit Kit Mailed (add the SmartPhrase under additional notes)     []  Reminded Patient to Complete Home Test             []      Diabetic Eye Exam []  Eye Camera Scheduled or Optometry Referral Placed     []  External Records Requested     []  Added Reminder to Complete to  Upcoming Primary Care Appt Notes     []  Patient Declined     []  Patient Will Call Back to Schedule     []  Patient Will Schedule with External Provider             []      Blood Pressure Control []  Primary Care Follow Up Visit Scheduled     []  Remote Blood Pressure Reading Captured     []  Added Reminder to Complete to Upcoming Primary Care Appt Notes     []  Patient Declined     []  Patient Will Call Back / Patient Will Send Portal Message with Reading     []  Patient Will Call Back to Schedule Provider Visit             []       HbA1c & Other Labs []  Lab Appt Scheduled for Due Labs     []  Primary Care Follow Up Visit Scheduled      []  Reminded Patient to Complete Home Test     []  Added Reminder to Complete to Upcoming Primary Care Appt Notes     []  Patient Declined     []  Patient Will Call Back to Schedule     []  Patient Will Schedule with External Provider / Order Routed if Applicable           []    Schedule Primary Care Appt []  Primary Care Appt Scheduled     []  Patient Declined     []  Patient Will Call Back to Schedule     []  Pt Established with External Provider & Updated Care Team             []      Medication Adherence []  Primary Care Appointment Scheduled     []  Added Reminder to Upcoming Primary Care Appt Notes     []  Patient Reminded to  Prescription     []  Patient Declined, Provider Notified if Needed     []  Sent Provider Message to Review and/or Add Exclusion to Problem List             []      Osteoporosis Screening []  DXA Appointment Scheduled     []  External Records Requested     []  Added Reminder to Complete to Upcoming Primary Care Appt Notes     []  Patient Declined     []  Patient Will Call Back to Schedule     []  Patient Will Schedule with External Provider / Order Routed if Applicable     Additional Care Coordinator Notes:         Further Action Needed If Patient Returns Outreach:

## 2023-08-21 NOTE — TELEPHONE ENCOUNTER
No new care gaps identified.  [facility] Embedded Care Gaps. Reference number: [id number]. 1/22/2023   12:32:20 PM CST   Information: Selecting Yes will display possible errors in your note based on the variables you have selected. This validation is only offered as a suggestion for you. PLEASE NOTE THAT THE VALIDATION TEXT WILL BE REMOVED WHEN YOU FINALIZE YOUR NOTE. IF YOU WANT TO FAX A PRELIMINARY NOTE YOU WILL NEED TO TOGGLE THIS TO 'NO' IF YOU DO NOT WANT IT IN YOUR FAXED NOTE.

## 2023-10-13 ENCOUNTER — HOSPITAL ENCOUNTER (OUTPATIENT)
Dept: RADIOLOGY | Facility: HOSPITAL | Age: 43
Discharge: HOME | End: 2023-10-13
Attending: FAMILY MEDICINE
Payer: COMMERCIAL

## 2023-10-13 DIAGNOSIS — Z12.31 ENCOUNTER FOR SCREENING MAMMOGRAM FOR MALIGNANT NEOPLASM OF BREAST: ICD-10-CM

## 2023-10-13 PROCEDURE — 77067 MAMMO DIGITAL SCREENING BILAT WITH TOMO: ICD-10-PCS | Mod: 26,,, | Performed by: RADIOLOGY

## 2023-10-13 PROCEDURE — 77063 MAMMO DIGITAL SCREENING BILAT WITH TOMO: ICD-10-PCS | Mod: 26,,, | Performed by: RADIOLOGY

## 2023-10-13 PROCEDURE — 77067 SCR MAMMO BI INCL CAD: CPT | Mod: TC,PN

## 2023-10-13 PROCEDURE — 77067 SCR MAMMO BI INCL CAD: CPT | Mod: 26,,, | Performed by: RADIOLOGY

## 2023-10-13 PROCEDURE — 77063 BREAST TOMOSYNTHESIS BI: CPT | Mod: 26,,, | Performed by: RADIOLOGY

## 2023-11-07 ENCOUNTER — OFFICE VISIT (OUTPATIENT)
Dept: FAMILY MEDICINE | Facility: CLINIC | Age: 43
End: 2023-11-07
Payer: COMMERCIAL

## 2023-11-07 ENCOUNTER — LAB VISIT (OUTPATIENT)
Dept: LAB | Facility: HOSPITAL | Age: 43
End: 2023-11-07
Attending: FAMILY MEDICINE
Payer: COMMERCIAL

## 2023-11-07 VITALS
HEART RATE: 73 BPM | WEIGHT: 256.81 LBS | DIASTOLIC BLOOD PRESSURE: 88 MMHG | SYSTOLIC BLOOD PRESSURE: 122 MMHG | OXYGEN SATURATION: 98 % | HEIGHT: 65 IN | BODY MASS INDEX: 42.79 KG/M2

## 2023-11-07 DIAGNOSIS — R73.09 ABNORMAL GLUCOSE: ICD-10-CM

## 2023-11-07 DIAGNOSIS — M25.561 CHRONIC PAIN OF RIGHT KNEE: ICD-10-CM

## 2023-11-07 DIAGNOSIS — Z00.01 ANNUAL VISIT FOR GENERAL ADULT MEDICAL EXAMINATION WITH ABNORMAL FINDINGS: ICD-10-CM

## 2023-11-07 DIAGNOSIS — E66.01 CLASS 3 SEVERE OBESITY DUE TO EXCESS CALORIES WITHOUT SERIOUS COMORBIDITY WITH BODY MASS INDEX (BMI) OF 40.0 TO 44.9 IN ADULT: ICD-10-CM

## 2023-11-07 DIAGNOSIS — Z00.01 ANNUAL VISIT FOR GENERAL ADULT MEDICAL EXAMINATION WITH ABNORMAL FINDINGS: Primary | ICD-10-CM

## 2023-11-07 DIAGNOSIS — G89.29 CHRONIC PAIN OF RIGHT KNEE: ICD-10-CM

## 2023-11-07 DIAGNOSIS — H26.9 EARLY CATARACTS, BILATERAL: ICD-10-CM

## 2023-11-07 DIAGNOSIS — E78.49 OTHER HYPERLIPIDEMIA: ICD-10-CM

## 2023-11-07 LAB
ALBUMIN SERPL BCP-MCNC: 4.1 G/DL (ref 3.5–5.2)
ALP SERPL-CCNC: 114 U/L (ref 55–135)
ALT SERPL W/O P-5'-P-CCNC: 17 U/L (ref 10–44)
ANION GAP SERPL CALC-SCNC: 13 MMOL/L (ref 8–16)
AST SERPL-CCNC: 21 U/L (ref 10–40)
BASOPHILS # BLD AUTO: 0.04 K/UL (ref 0–0.2)
BASOPHILS NFR BLD: 0.5 % (ref 0–1.9)
BILIRUB SERPL-MCNC: 0.7 MG/DL (ref 0.1–1)
BUN SERPL-MCNC: 14 MG/DL (ref 6–20)
CALCIUM SERPL-MCNC: 10.2 MG/DL (ref 8.7–10.5)
CHLORIDE SERPL-SCNC: 106 MMOL/L (ref 95–110)
CHOLEST SERPL-MCNC: 244 MG/DL (ref 120–199)
CHOLEST/HDLC SERPL: 4.9 {RATIO} (ref 2–5)
CO2 SERPL-SCNC: 22 MMOL/L (ref 23–29)
CREAT SERPL-MCNC: 1 MG/DL (ref 0.5–1.4)
DIFFERENTIAL METHOD: ABNORMAL
EOSINOPHIL # BLD AUTO: 0.2 K/UL (ref 0–0.5)
EOSINOPHIL NFR BLD: 2.5 % (ref 0–8)
ERYTHROCYTE [DISTWIDTH] IN BLOOD BY AUTOMATED COUNT: 13.3 % (ref 11.5–14.5)
EST. GFR  (NO RACE VARIABLE): >60 ML/MIN/1.73 M^2
ESTIMATED AVG GLUCOSE: 114 MG/DL (ref 68–131)
GLUCOSE SERPL-MCNC: 122 MG/DL (ref 70–110)
HBA1C MFR BLD: 5.6 % (ref 4–5.6)
HCT VFR BLD AUTO: 42.7 % (ref 37–48.5)
HDLC SERPL-MCNC: 50 MG/DL (ref 40–75)
HDLC SERPL: 20.5 % (ref 20–50)
HGB BLD-MCNC: 14.1 G/DL (ref 12–16)
IMM GRANULOCYTES # BLD AUTO: 0.02 K/UL (ref 0–0.04)
IMM GRANULOCYTES NFR BLD AUTO: 0.3 % (ref 0–0.5)
INSULIN COLLECTION INTERVAL: NORMAL
INSULIN SERPL-ACNC: 9.3 UU/ML
LDLC SERPL CALC-MCNC: 171.8 MG/DL (ref 63–159)
LYMPHOCYTES # BLD AUTO: 2.2 K/UL (ref 1–4.8)
LYMPHOCYTES NFR BLD: 28.6 % (ref 18–48)
MCH RBC QN AUTO: 31.4 PG (ref 27–31)
MCHC RBC AUTO-ENTMCNC: 33 G/DL (ref 32–36)
MCV RBC AUTO: 95 FL (ref 82–98)
MONOCYTES # BLD AUTO: 0.4 K/UL (ref 0.3–1)
MONOCYTES NFR BLD: 4.7 % (ref 4–15)
NEUTROPHILS # BLD AUTO: 4.8 K/UL (ref 1.8–7.7)
NEUTROPHILS NFR BLD: 63.4 % (ref 38–73)
NONHDLC SERPL-MCNC: 194 MG/DL
NRBC BLD-RTO: 0 /100 WBC
PLATELET # BLD AUTO: 259 K/UL (ref 150–450)
PMV BLD AUTO: 11 FL (ref 9.2–12.9)
POTASSIUM SERPL-SCNC: 4.2 MMOL/L (ref 3.5–5.1)
PROT SERPL-MCNC: 8.2 G/DL (ref 6–8.4)
RBC # BLD AUTO: 4.49 M/UL (ref 4–5.4)
SODIUM SERPL-SCNC: 141 MMOL/L (ref 136–145)
TRIGL SERPL-MCNC: 111 MG/DL (ref 30–150)
TSH SERPL DL<=0.005 MIU/L-ACNC: 1.98 UIU/ML (ref 0.4–4)
WBC # BLD AUTO: 7.58 K/UL (ref 3.9–12.7)

## 2023-11-07 PROCEDURE — 3008F PR BODY MASS INDEX (BMI) DOCUMENTED: ICD-10-PCS | Mod: CPTII,S$GLB,, | Performed by: FAMILY MEDICINE

## 2023-11-07 PROCEDURE — 3074F PR MOST RECENT SYSTOLIC BLOOD PRESSURE < 130 MM HG: ICD-10-PCS | Mod: CPTII,S$GLB,, | Performed by: FAMILY MEDICINE

## 2023-11-07 PROCEDURE — 1159F PR MEDICATION LIST DOCUMENTED IN MEDICAL RECORD: ICD-10-PCS | Mod: CPTII,S$GLB,, | Performed by: FAMILY MEDICINE

## 2023-11-07 PROCEDURE — 85025 COMPLETE CBC W/AUTO DIFF WBC: CPT | Performed by: FAMILY MEDICINE

## 2023-11-07 PROCEDURE — 83525 ASSAY OF INSULIN: CPT | Performed by: FAMILY MEDICINE

## 2023-11-07 PROCEDURE — 99999 PR PBB SHADOW E&M-EST. PATIENT-LVL IV: CPT | Mod: PBBFAC,,, | Performed by: FAMILY MEDICINE

## 2023-11-07 PROCEDURE — 99999 PR PBB SHADOW E&M-EST. PATIENT-LVL IV: ICD-10-PCS | Mod: PBBFAC,,, | Performed by: FAMILY MEDICINE

## 2023-11-07 PROCEDURE — 99396 PREV VISIT EST AGE 40-64: CPT | Mod: 25,S$GLB,, | Performed by: FAMILY MEDICINE

## 2023-11-07 PROCEDURE — 90686 FLU VACCINE (QUAD) GREATER THAN OR EQUAL TO 3YO PRESERVATIVE FREE IM: ICD-10-PCS | Mod: S$GLB,,, | Performed by: FAMILY MEDICINE

## 2023-11-07 PROCEDURE — 3008F BODY MASS INDEX DOCD: CPT | Mod: CPTII,S$GLB,, | Performed by: FAMILY MEDICINE

## 2023-11-07 PROCEDURE — 80053 COMPREHEN METABOLIC PANEL: CPT | Performed by: FAMILY MEDICINE

## 2023-11-07 PROCEDURE — 3079F DIAST BP 80-89 MM HG: CPT | Mod: CPTII,S$GLB,, | Performed by: FAMILY MEDICINE

## 2023-11-07 PROCEDURE — 1159F MED LIST DOCD IN RCRD: CPT | Mod: CPTII,S$GLB,, | Performed by: FAMILY MEDICINE

## 2023-11-07 PROCEDURE — 90471 IMMUNIZATION ADMIN: CPT | Mod: S$GLB,,, | Performed by: FAMILY MEDICINE

## 2023-11-07 PROCEDURE — 3074F SYST BP LT 130 MM HG: CPT | Mod: CPTII,S$GLB,, | Performed by: FAMILY MEDICINE

## 2023-11-07 PROCEDURE — 99396 PR PREVENTIVE VISIT,EST,40-64: ICD-10-PCS | Mod: 25,S$GLB,, | Performed by: FAMILY MEDICINE

## 2023-11-07 PROCEDURE — 90686 IIV4 VACC NO PRSV 0.5 ML IM: CPT | Mod: S$GLB,,, | Performed by: FAMILY MEDICINE

## 2023-11-07 PROCEDURE — 90471 FLU VACCINE (QUAD) GREATER THAN OR EQUAL TO 3YO PRESERVATIVE FREE IM: ICD-10-PCS | Mod: S$GLB,,, | Performed by: FAMILY MEDICINE

## 2023-11-07 PROCEDURE — 83036 HEMOGLOBIN GLYCOSYLATED A1C: CPT | Performed by: FAMILY MEDICINE

## 2023-11-07 PROCEDURE — 3079F PR MOST RECENT DIASTOLIC BLOOD PRESSURE 80-89 MM HG: ICD-10-PCS | Mod: CPTII,S$GLB,, | Performed by: FAMILY MEDICINE

## 2023-11-07 PROCEDURE — 80061 LIPID PANEL: CPT | Performed by: FAMILY MEDICINE

## 2023-11-07 PROCEDURE — 84443 ASSAY THYROID STIM HORMONE: CPT | Performed by: FAMILY MEDICINE

## 2023-11-07 NOTE — PROGRESS NOTES
Assessment:       1. Annual visit for general adult medical examination with abnormal findings    2. Class 3 severe obesity due to excess calories without serious comorbidity with body mass index (BMI) of 40.0 to 44.9 in adult    3. Other hyperlipidemia    4. Chronic pain of right knee    5. Early cataracts, bilateral    6. Abnormal glucose        Plan:       Annual visit for general adult medical examination with abnormal findings  -     CBC Auto Differential; Future; Expected date: 11/07/2023  -     Comprehensive Metabolic Panel; Future; Expected date: 11/07/2023  -     Lipid Panel; Future; Expected date: 11/07/2023  -     TSH; Future; Expected date: 11/07/2023  -     Insulin, Random; Future; Expected date: 11/07/2023  -     Hemoglobin A1C; Future; Expected date: 11/07/2023    Class 3 severe obesity due to excess calories without serious comorbidity with body mass index (BMI) of 40.0 to 44.9 in adult:  Worsening  -     Insulin, Random; Future; Expected date: 11/07/2023    Other hyperlipidemia:  Uncontrolled    Chronic pain of right knee:  Improved    Early cataracts, bilateral:  Stable    Abnormal glucose:  Improved  -     Hemoglobin A1C; Future; Expected date: 11/07/2023         Recommend the patient to continue drinking plenty water, continue eating healthy, weight loss, increase physical activity as tolerated through patient will start monitor the blood pressure at home once daily for the next 2 weeks, if the blood pressure continues to be elevated, the patient will let us know.  The patient's BMI has been recorded in the chart. The patient has been provided educational materials regarding the benefits of attaining and maintaining a normal weight. We will continue to address and follow this issue during follow up visits.   Patient agreed with assessment and plan. Patient verbalized understanding.     Subjective:       Patient ID: Steff Cummings is a 43 y.o. female.    Chief Complaint: Annual  Exam    HPI    The patient is coming here today for an annual checkup.  The patient had her right knee surgery and is doing better, stated that she took more time than expected to recover and still recovering from it.  The patient stated that she is been trying to eat healthy and drinking plenty water, but is gaining weight as she is not able to exercise as much.  She has not been monitoring her blood pressure home.  She was diagnosed with hypertension in the past, but she stop taking blood pressure medication for more than 10 years.  She was diagnosed with cataracts, currently using drops.  Upon review of the last blood work, the patient has abnormal glucose levels but they were improved from previous and normal vitamin-D levels.    Past medical history, past social history was reviewed and discussed with the patient.    Review of Systems   Constitutional:  Negative for activity change, appetite change and chills.   HENT:  Negative for congestion and ear discharge.    Eyes:  Negative for discharge and itching.   Respiratory:  Negative for choking and chest tightness.    Cardiovascular:  Negative for chest pain and palpitations.   Gastrointestinal:  Negative for abdominal distention and abdominal pain.   Endocrine: Negative for cold intolerance and heat intolerance.   Genitourinary:  Negative for dysuria and flank pain.   Musculoskeletal:  Positive for arthralgias. Negative for back pain.   Skin:  Negative for pallor and rash.   Allergic/Immunologic: Negative for environmental allergies and food allergies.   Neurological:  Negative for dizziness and facial asymmetry.   Hematological:  Negative for adenopathy. Does not bruise/bleed easily.   Psychiatric/Behavioral:  Negative for agitation and confusion.        Objective:      Physical Exam  Vitals and nursing note reviewed.   Constitutional:       General: She is not in acute distress.     Appearance: Normal appearance. She is well-developed. She is obese. She is not  diaphoretic.   HENT:      Head: Normocephalic and atraumatic.      Right Ear: External ear normal.      Left Ear: External ear normal.      Nose: Nose normal.      Mouth/Throat:      Pharynx: No oropharyngeal exudate.   Eyes:      General: No scleral icterus.        Right eye: No discharge.         Left eye: No discharge.      Conjunctiva/sclera: Conjunctivae normal.      Pupils: Pupils are equal, round, and reactive to light.   Cardiovascular:      Rate and Rhythm: Normal rate and regular rhythm.      Heart sounds: Normal heart sounds.   Pulmonary:      Effort: Pulmonary effort is normal. No respiratory distress.      Breath sounds: Normal breath sounds. No wheezing.   Abdominal:      General: Abdomen is flat. Bowel sounds are normal. There is no distension.      Tenderness: There is no abdominal tenderness.   Musculoskeletal:         General: No deformity.      Cervical back: Neck supple.   Skin:     Coloration: Skin is not pale.      Findings: No erythema.   Neurological:      Mental Status: She is alert.      Cranial Nerves: No cranial nerve deficit.      Coordination: Coordination normal.   Psychiatric:         Behavior: Behavior normal.         Thought Content: Thought content normal.         Judgment: Judgment normal.

## 2024-10-23 DIAGNOSIS — Z12.31 OTHER SCREENING MAMMOGRAM: ICD-10-CM

## 2025-08-19 ENCOUNTER — PATIENT MESSAGE (OUTPATIENT)
Dept: ADMINISTRATIVE | Facility: HOSPITAL | Age: 45
End: 2025-08-19
Payer: COMMERCIAL